# Patient Record
Sex: FEMALE | Race: WHITE | NOT HISPANIC OR LATINO | Employment: UNEMPLOYED | URBAN - METROPOLITAN AREA
[De-identification: names, ages, dates, MRNs, and addresses within clinical notes are randomized per-mention and may not be internally consistent; named-entity substitution may affect disease eponyms.]

---

## 2017-03-10 ENCOUNTER — HOSPITAL ENCOUNTER (EMERGENCY)
Facility: HOSPITAL | Age: 27
Discharge: HOME | End: 2017-03-10
Attending: EMERGENCY MEDICINE

## 2017-03-10 VITALS
TEMPERATURE: 98 F | BODY MASS INDEX: 31.34 KG/M2 | HEART RATE: 80 BPM | HEIGHT: 66 IN | SYSTOLIC BLOOD PRESSURE: 120 MMHG | DIASTOLIC BLOOD PRESSURE: 88 MMHG | WEIGHT: 195 LBS | OXYGEN SATURATION: 98 % | RESPIRATION RATE: 16 BRPM

## 2017-03-10 DIAGNOSIS — N76.0 BV (BACTERIAL VAGINOSIS): ICD-10-CM

## 2017-03-10 DIAGNOSIS — N93.8 DYSFUNCTIONAL UTERINE BLEEDING: Primary | ICD-10-CM

## 2017-03-10 DIAGNOSIS — B96.89 BV (BACTERIAL VAGINOSIS): ICD-10-CM

## 2017-03-10 LAB
B-HCG UR QL: NEGATIVE
BACTERIA #/AREA URNS HPF: ABNORMAL /HPF
BACTERIA GENITAL QL WET PREP: ABNORMAL
BASOPHILS # BLD AUTO: 0.04 K/UL
BASOPHILS NFR BLD: 0.3 %
BILIRUB UR QL STRIP: NEGATIVE
CLARITY UR: CLEAR
CLUE CELLS VAG QL WET PREP: ABNORMAL
COLOR UR: YELLOW
CTP QC/QA: YES
DIFFERENTIAL METHOD: ABNORMAL
EOSINOPHIL # BLD AUTO: 0.3 K/UL
EOSINOPHIL NFR BLD: 2.5 %
ERYTHROCYTE [DISTWIDTH] IN BLOOD BY AUTOMATED COUNT: 13.7 %
FILAMENT FUNGI VAG WET PREP-#/AREA: ABNORMAL
GLUCOSE UR QL STRIP: NEGATIVE
HCG INTACT+B SERPL-ACNC: <1.2 MIU/ML
HCT VFR BLD AUTO: 42.7 %
HGB BLD-MCNC: 14.7 G/DL
HGB UR QL STRIP: ABNORMAL
KETONES UR QL STRIP: NEGATIVE
LEUKOCYTE ESTERASE UR QL STRIP: NEGATIVE
LYMPHOCYTES # BLD AUTO: 5 K/UL
LYMPHOCYTES NFR BLD: 39 %
MCH RBC QN AUTO: 30.1 PG
MCHC RBC AUTO-ENTMCNC: 34.4 %
MCV RBC AUTO: 88 FL
MICROSCOPIC COMMENT: ABNORMAL
MONOCYTES # BLD AUTO: 0.7 K/UL
MONOCYTES NFR BLD: 5.1 %
NEUTROPHILS # BLD AUTO: 6.8 K/UL
NEUTROPHILS NFR BLD: 53.1 %
NITRITE UR QL STRIP: NEGATIVE
PH UR STRIP: 5 [PH] (ref 5–8)
PLATELET # BLD AUTO: 287 K/UL
PMV BLD AUTO: 9.6 FL
PROT UR QL STRIP: NEGATIVE
RBC # BLD AUTO: 4.88 M/UL
RBC #/AREA URNS HPF: 5 /HPF (ref 0–4)
SP GR UR STRIP: 1.02 (ref 1–1.03)
SPECIMEN SOURCE: ABNORMAL
SQUAMOUS #/AREA URNS HPF: 2 /HPF
T VAGINALIS GENITAL QL WET PREP: ABNORMAL
URN SPEC COLLECT METH UR: ABNORMAL
UROBILINOGEN UR STRIP-ACNC: NEGATIVE EU/DL
WBC # BLD AUTO: 12.87 K/UL
WBC #/AREA URNS HPF: 1 /HPF (ref 0–5)
WBC #/AREA VAG WET PREP: ABNORMAL
YEAST GENITAL QL WET PREP: ABNORMAL

## 2017-03-10 PROCEDURE — 81025 URINE PREGNANCY TEST: CPT | Performed by: EMERGENCY MEDICINE

## 2017-03-10 PROCEDURE — 84702 CHORIONIC GONADOTROPIN TEST: CPT

## 2017-03-10 PROCEDURE — 25000003 PHARM REV CODE 250: Performed by: NURSE PRACTITIONER

## 2017-03-10 PROCEDURE — 99284 EMERGENCY DEPT VISIT MOD MDM: CPT | Mod: 25

## 2017-03-10 PROCEDURE — 81000 URINALYSIS NONAUTO W/SCOPE: CPT

## 2017-03-10 PROCEDURE — 85025 COMPLETE CBC W/AUTO DIFF WBC: CPT

## 2017-03-10 PROCEDURE — 87591 N.GONORRHOEAE DNA AMP PROB: CPT

## 2017-03-10 PROCEDURE — 87210 SMEAR WET MOUNT SALINE/INK: CPT

## 2017-03-10 RX ORDER — METRONIDAZOLE 500 MG/1
500 TABLET ORAL EVERY 12 HOURS
Qty: 14 TABLET | Refills: 0 | Status: SHIPPED | OUTPATIENT
Start: 2017-03-10 | End: 2017-03-17

## 2017-03-10 RX ORDER — DICYCLOMINE HYDROCHLORIDE 10 MG/1
20 CAPSULE ORAL
Status: COMPLETED | OUTPATIENT
Start: 2017-03-10 | End: 2017-03-10

## 2017-03-10 RX ORDER — IBUPROFEN 600 MG/1
600 TABLET ORAL
Status: COMPLETED | OUTPATIENT
Start: 2017-03-10 | End: 2017-03-10

## 2017-03-10 RX ADMIN — DICYCLOMINE HYDROCHLORIDE 20 MG: 10 CAPSULE ORAL at 07:03

## 2017-03-10 RX ADMIN — IBUPROFEN 600 MG: 600 TABLET, FILM COATED ORAL at 07:03

## 2017-03-10 NOTE — ED AVS SNAPSHOT
[facility]  [address] [place] [address]               Shaye Bermudez   3/10/2017  6:03 PM   ED    Description:  Female : 1990   Department:  [facility]           Your Care was Coordinated By:     Provider Role From To    Taz Moralez MD Attending Provider 03/10/17 1807 --    Giovanna Miranda NP Nurse Practitioner 03/10/17 1807 --      Reason for Visit     Female  Problem           Diagnoses this Visit        Comments    Dysfunctional uterine bleeding    -  Primary     BV (bacterial vaginosis)           ED Disposition     None           To Do List           Follow-up Information     Schedule an appointment as soon as possible for a visit with Luis Ramos MD.    Specialties:  Obstetrics, Obstetrics and Gynecology    Why:  OB/GYN    Contact information:    [address] [place] [id number]  [phone]         These Medications        Disp Refills Start End    metronidazole (FLAGYL) 500 MG tablet 14 tablet 0 3/10/2017 3/17/2017    Take 1 tablet (500 mg total) by mouth every 12 (twelve) hours. - Oral    Pharmacy: [facility] [address] [place] [address] EXPY AT [facility] Ph #: [phone]         [facility]     [facility] -  Assistance  Registered nurses in the [facility] provide clinical advisement, health education, appointment booking, and other advisory services.  Call for this free service at [phone].             Medications           Message regarding Medications     Verify the changes and/or additions to your medication regime listed below are the same as discussed with your clinician today.  If any of these changes or additions are incorrect, please notify your healthcare provider.        START taking these NEW medications        Refills    metronidazole (FLAGYL) 500 MG tablet 0    Sig: Take 1 tablet (500 mg total) by mouth  "every 12 (twelve) hours.    Class: Print    Route: Oral      These medications were administered today        Dose Freq    dicyclomine capsule 20 mg 20 mg ED 1 Time    Sig: Take 2 capsules (20 mg total) by mouth ED 1 Time.    Class: Normal    Route: Oral    ibuprofen tablet 600 mg 600 mg ED 1 Time    Sig: Take 1 tablet (600 mg total) by mouth ED 1 Time.    Class: Normal    Route: Oral      STOP taking these medications     ibuprofen (ADVIL,MOTRIN) 600 MG tablet Take 1 tablet (600 mg total) by mouth every 6 (six) hours as needed for Pain.    misoprostol (CYTOTEC) 200 MCG Tab Take 2 tablets (400 mcg) by mouth in the morning prior to surgery           Verify that the below list of medications is an accurate representation of the medications you are currently taking.  If none reported, the list may be blank. If incorrect, please contact your healthcare provider. Carry this list with you in case of emergency.           Current Medications     metronidazole (FLAGYL) 500 MG tablet Take 1 tablet (500 mg total) by mouth every 12 (twelve) hours.           Clinical Reference Information           Your Vitals Were     BP Pulse Temp Resp Height Weight    134/67 (BP Location: Right arm, Patient Position: Sitting) 91 98.5 °F (36.9 °C) (Oral) 17 5' 6" (1.676 m) 88.5 kg (195 lb)    SpO2 BMI             100% 31.47 kg/m2         Allergies as of 3/10/2017     No Known Allergies      Immunizations Administered on Date of Encounter - 3/10/2017     None      ED Micro, Lab, POCT     Start Ordered       Status Ordering Provider    03/10/17 1827 03/10/17 1828  Vaginal Screen Vagina  STAT      Final result     03/10/17 1827 03/10/17 1828  C. trachomatis/N. gonorrhoeae by AMP DNA Cervix  STAT      In process     03/10/17 1827 03/10/17 1828  CBC auto differential  STAT      Preliminary result     03/10/17 1827 03/10/17 1828  hCG, quantitative, pregnancy  STAT      Final result     03/10/17 1758 03/10/17 1757  Urinalysis  STAT      Final result  "    03/10/17 1758 03/10/17 1757  POCT urine pregnancy  Once      Final result     03/10/17 1757 03/10/17 1757  Urinalysis Microscopic  Once      Final result       ED Imaging Orders     None        Discharge Instructions       Please return to the ED for any new or worsening symptoms: severe abdominal pain, bleeding saturating more than 1 pad per hour, loss of consciousness or any other concerns. Please follow up with OB/GYN within in the week. You may also call [phone] for the [facility] same day appointment line.    Discharge References/Attachments     BACTERIAL VAGINOSIS (BV) (ENGLISH)    VAGINAL INFECTION: BACTERIAL VAGINOSIS (ENGLISH)    DYSFUNCTIONAL UTERINE BLEEDING (ENGLISH)       [facility] complies with applicable Federal civil rights laws and does not discriminate on the basis of race, color, national origin, age, disability, or sex.        Language Assistance Services     ATTENTION: Language assistance services are available, free of charge. Please call [phone].      ATENCIÓN: Si habla español, tiene a watts disposición servicios gratuitos de asistencia lingüística. Llame al [phone].     CHÚ Ý: N?u b?n nói Ti?ng Vi?t, có các d?ch v? h? tr? ngôn ng? mi?n phí dành cho b?n. G?i s? [phone].

## 2017-03-11 NOTE — DISCHARGE INSTRUCTIONS
Please return to the ED for any new or worsening symptoms: severe abdominal pain, bleeding saturating more than 1 pad per hour, loss of consciousness or any other concerns. Please follow up with OB/GYN within in the week. You may also call [phone] for the [facility] same day appointment line.

## 2017-03-11 NOTE — ED TRIAGE NOTES
Empty sac since October vaginal bleeding since November cramping badly today and feels like something trying to come out

## 2017-03-13 LAB
C TRACH DNA SPEC QL NAA+PROBE: NOT DETECTED
N GONORRHOEA DNA SPEC QL NAA+PROBE: NOT DETECTED

## 2017-03-22 ENCOUNTER — NURSE TRIAGE (OUTPATIENT)
Dept: ADMINISTRATIVE | Facility: CLINIC | Age: 27
End: 2017-03-22

## 2017-03-22 NOTE — TELEPHONE ENCOUNTER
Reason for Disposition   Severe itching    Protocols used: ST RASH - WIDESPREAD AND CAUSE UNKNOWN-A-OH    Patient is calling stating that she was put on Flagyl  For BV and is almost finish taking the pills (3 more to go).  Patient is experiencing a rash that is on her chest and has gone to her neck.  Patient feels very itchy.  Patient also is experiencing sneezing and itchy eyes.  Advised to go to urgent care.  Patient is questioning how much is going to cost without insurance.  Called the back line to urgent care [facility] and was told the cost will be $250.00 without insurance.  Advised the patient accordingly.  Also advised home care measures and to continue to take the flagyl and if she become worst to go to ER for further evaluation.

## 2017-10-24 ENCOUNTER — TELEPHONE (OUTPATIENT)
Dept: OBSTETRICS AND GYNECOLOGY | Facility: CLINIC | Age: 27
End: 2017-10-24

## 2017-10-24 DIAGNOSIS — Z36.9 ANTENATAL SCREENING ENCOUNTER: Primary | ICD-10-CM

## 2017-10-24 NOTE — TELEPHONE ENCOUNTER
----- Message from Kathryn Ortiz sent at 10/24/2017 10:54 AM CDT -----  Contact: self    Who Called: pt    Date of Positive Preg Test: September 28th 1st day of Last Menstrual Cycle: since July 6th    List Any Difficulties: none    What Number to Call Back:[phone]    Pt should expect a return call by the end of the day.

## 2017-10-30 ENCOUNTER — HOSPITAL ENCOUNTER (INPATIENT)
Facility: OTHER | Age: 27
LOS: 1 days | Discharge: HOME | End: 2017-11-01
Attending: OBSTETRICS & GYNECOLOGY | Admitting: OBSTETRICS & GYNECOLOGY
Payer: MEDICAID

## 2017-10-30 ENCOUNTER — ROUTINE PRENATAL (OUTPATIENT)
Dept: OBSTETRICS AND GYNECOLOGY | Facility: CLINIC | Age: 27
End: 2017-10-30
Attending: OBSTETRICS & GYNECOLOGY
Payer: MEDICAID

## 2017-10-30 VITALS
WEIGHT: 184.31 LBS | BODY MASS INDEX: 29.75 KG/M2 | SYSTOLIC BLOOD PRESSURE: 112 MMHG | DIASTOLIC BLOOD PRESSURE: 54 MMHG

## 2017-10-30 DIAGNOSIS — Z3A.16 16 WEEKS GESTATION OF PREGNANCY: ICD-10-CM

## 2017-10-30 DIAGNOSIS — Z34.92 ENCOUNTER FOR SUPERVISION OF NORMAL PREGNANCY IN SECOND TRIMESTER, UNSPECIFIED GRAVIDITY: ICD-10-CM

## 2017-10-30 DIAGNOSIS — O23.00 PYELONEPHRITIS AFFECTING PREGNANCY: Primary | ICD-10-CM

## 2017-10-30 DIAGNOSIS — O34.219 PREVIOUS CESAREAN DELIVERY AFFECTING PREGNANCY, ANTEPARTUM: Primary | ICD-10-CM

## 2017-10-30 DIAGNOSIS — O23.02 PYELONEPHRITIS AFFECTING PREGNANCY IN SECOND TRIMESTER: ICD-10-CM

## 2017-10-30 DIAGNOSIS — Z12.4 PAP SMEAR FOR CERVICAL CANCER SCREENING: ICD-10-CM

## 2017-10-30 DIAGNOSIS — R31.9 HEMATURIA, UNSPECIFIED TYPE: ICD-10-CM

## 2017-10-30 LAB
ALBUMIN SERPL BCP-MCNC: 2.9 G/DL
ALP SERPL-CCNC: 121 U/L
ALT SERPL W/O P-5'-P-CCNC: 19 U/L
ANION GAP SERPL CALC-SCNC: 8 MMOL/L
AST SERPL-CCNC: 12 U/L
BACTERIA #/AREA URNS HPF: ABNORMAL /HPF
BASOPHILS # BLD AUTO: 0.02 K/UL
BASOPHILS NFR BLD: 0.1 %
BILIRUB SERPL-MCNC: 0.3 MG/DL
BILIRUB SERPL-MCNC: NORMAL MG/DL
BILIRUB UR QL STRIP: NEGATIVE
BLOOD URINE, POC: NORMAL
BUN SERPL-MCNC: 8 MG/DL
CALCIUM SERPL-MCNC: 9 MG/DL
CHLORIDE SERPL-SCNC: 107 MMOL/L
CLARITY UR: CLEAR
CO2 SERPL-SCNC: 23 MMOL/L
COLOR UR: YELLOW
COLOR, POC UA: NORMAL
CREAT SERPL-MCNC: 0.6 MG/DL
DIFFERENTIAL METHOD: ABNORMAL
EOSINOPHIL # BLD AUTO: 0.1 K/UL
EOSINOPHIL NFR BLD: 0.7 %
ERYTHROCYTE [DISTWIDTH] IN BLOOD BY AUTOMATED COUNT: 13.4 %
EST. GFR  (AFRICAN AMERICAN): >60 ML/MIN/1.73 M^2
EST. GFR  (NON AFRICAN AMERICAN): >60 ML/MIN/1.73 M^2
GLUCOSE SERPL-MCNC: 79 MG/DL
GLUCOSE UR QL STRIP: NEGATIVE
GLUCOSE UR QL STRIP: NORMAL
HCT VFR BLD AUTO: 35.1 %
HGB BLD-MCNC: 12 G/DL
HGB UR QL STRIP: ABNORMAL
HYALINE CASTS #/AREA URNS LPF: 0 /LPF
KETONES UR QL STRIP: NEGATIVE
KETONES UR QL STRIP: NORMAL
LEUKOCYTE ESTERASE UR QL STRIP: ABNORMAL
LEUKOCYTE ESTERASE URINE, POC: NORMAL
LYMPHOCYTES # BLD AUTO: 3.2 K/UL
LYMPHOCYTES NFR BLD: 21.1 %
MCH RBC QN AUTO: 30 PG
MCHC RBC AUTO-ENTMCNC: 34.2 G/DL
MCV RBC AUTO: 88 FL
MICROSCOPIC COMMENT: ABNORMAL
MONOCYTES # BLD AUTO: 0.8 K/UL
MONOCYTES NFR BLD: 5.1 %
NEUTROPHILS # BLD AUTO: 10.9 K/UL
NEUTROPHILS NFR BLD: 73 %
NITRITE UR QL STRIP: POSITIVE
NITRITE, POC UA: NORMAL
PH UR STRIP: 6 [PH] (ref 5–8)
PH, POC UA: 5
PLATELET # BLD AUTO: 267 K/UL
PLATELET BLD QL SMEAR: ABNORMAL
PMV BLD AUTO: 9.7 FL
POTASSIUM SERPL-SCNC: 3.7 MMOL/L
PROT SERPL-MCNC: 7.2 G/DL
PROT UR QL STRIP: ABNORMAL
PROTEIN, POC: NORMAL
RBC # BLD AUTO: 4 M/UL
RBC #/AREA URNS HPF: 1 /HPF (ref 0–4)
SODIUM SERPL-SCNC: 138 MMOL/L
SP GR UR STRIP: 1.02 (ref 1–1.03)
SPECIFIC GRAVITY, POC UA: 1.01
URN SPEC COLLECT METH UR: ABNORMAL
UROBILINOGEN UR STRIP-ACNC: NEGATIVE EU/DL
UROBILINOGEN, POC UA: NORMAL
WBC # BLD AUTO: 15.05 K/UL
WBC #/AREA URNS HPF: 20 /HPF (ref 0–5)

## 2017-10-30 PROCEDURE — 87186 SC STD MICRODIL/AGAR DIL: CPT

## 2017-10-30 PROCEDURE — 81002 URINALYSIS NONAUTO W/O SCOPE: CPT

## 2017-10-30 PROCEDURE — 99214 OFFICE O/P EST MOD 30 MIN: CPT | Mod: TH,S$PBB,, | Performed by: OBSTETRICS & GYNECOLOGY

## 2017-10-30 PROCEDURE — 80053 COMPREHEN METABOLIC PANEL: CPT

## 2017-10-30 PROCEDURE — 85025 COMPLETE CBC W/AUTO DIFF WBC: CPT

## 2017-10-30 PROCEDURE — G0378 HOSPITAL OBSERVATION PER HR: HCPCS

## 2017-10-30 PROCEDURE — 99284 EMERGENCY DEPT VISIT MOD MDM: CPT | Mod: ,,, | Performed by: OBSTETRICS & GYNECOLOGY

## 2017-10-30 PROCEDURE — 87088 URINE BACTERIA CULTURE: CPT

## 2017-10-30 PROCEDURE — 99213 OFFICE O/P EST LOW 20 MIN: CPT | Mod: PBBFAC,PN | Performed by: OBSTETRICS & GYNECOLOGY

## 2017-10-30 PROCEDURE — 11000001 HC ACUTE MED/SURG PRIVATE ROOM

## 2017-10-30 PROCEDURE — 88175 CYTOPATH C/V AUTO FLUID REDO: CPT

## 2017-10-30 PROCEDURE — 99285 EMERGENCY DEPT VISIT HI MDM: CPT | Mod: 25,27

## 2017-10-30 PROCEDURE — 63600175 PHARM REV CODE 636 W HCPCS: Performed by: STUDENT IN AN ORGANIZED HEALTH CARE EDUCATION/TRAINING PROGRAM

## 2017-10-30 PROCEDURE — 99999 PR PBB SHADOW E&M-EST. PATIENT-LVL III: CPT | Mod: PBBFAC,,, | Performed by: OBSTETRICS & GYNECOLOGY

## 2017-10-30 PROCEDURE — 87591 N.GONORRHOEAE DNA AMP PROB: CPT

## 2017-10-30 PROCEDURE — 87086 URINE CULTURE/COLONY COUNT: CPT

## 2017-10-30 PROCEDURE — 25000003 PHARM REV CODE 250: Performed by: STUDENT IN AN ORGANIZED HEALTH CARE EDUCATION/TRAINING PROGRAM

## 2017-10-30 PROCEDURE — 81000 URINALYSIS NONAUTO W/SCOPE: CPT

## 2017-10-30 PROCEDURE — 87077 CULTURE AEROBIC IDENTIFY: CPT

## 2017-10-30 RX ORDER — SODIUM CHLORIDE 9 MG/ML
INJECTION, SOLUTION INTRAVENOUS CONTINUOUS
Status: DISCONTINUED | OUTPATIENT
Start: 2017-10-30 | End: 2017-10-31

## 2017-10-30 RX ORDER — ACETAMINOPHEN 325 MG/1
650 TABLET ORAL EVERY 6 HOURS PRN
Status: DISCONTINUED | OUTPATIENT
Start: 2017-10-30 | End: 2017-11-01 | Stop reason: HOSPADM

## 2017-10-30 RX ORDER — ONDANSETRON 8 MG/1
8 TABLET, ORALLY DISINTEGRATING ORAL EVERY 8 HOURS PRN
Status: DISCONTINUED | OUTPATIENT
Start: 2017-10-30 | End: 2017-11-01 | Stop reason: HOSPADM

## 2017-10-30 RX ORDER — PRENATAL WITH FERROUS FUM AND FOLIC ACID 3080; 920; 120; 400; 22; 1.84; 3; 20; 10; 1; 12; 200; 27; 25; 2 [IU]/1; [IU]/1; MG/1; [IU]/1; MG/1; MG/1; MG/1; MG/1; MG/1; MG/1; UG/1; MG/1; MG/1; MG/1; MG/1
1 TABLET ORAL DAILY
Status: DISCONTINUED | OUTPATIENT
Start: 2017-10-31 | End: 2017-11-01 | Stop reason: HOSPADM

## 2017-10-30 RX ORDER — AMOXICILLIN 250 MG
1 CAPSULE ORAL NIGHTLY PRN
Status: DISCONTINUED | OUTPATIENT
Start: 2017-10-30 | End: 2017-11-01 | Stop reason: HOSPADM

## 2017-10-30 RX ORDER — SIMETHICONE 80 MG
1 TABLET,CHEWABLE ORAL EVERY 6 HOURS PRN
Status: DISCONTINUED | OUTPATIENT
Start: 2017-10-30 | End: 2017-11-01 | Stop reason: HOSPADM

## 2017-10-30 RX ORDER — DIPHENHYDRAMINE HCL 25 MG
25 CAPSULE ORAL EVERY 4 HOURS PRN
Status: DISCONTINUED | OUTPATIENT
Start: 2017-10-30 | End: 2017-11-01 | Stop reason: HOSPADM

## 2017-10-30 RX ORDER — DIPHENHYDRAMINE HYDROCHLORIDE 50 MG/ML
25 INJECTION INTRAMUSCULAR; INTRAVENOUS EVERY 4 HOURS PRN
Status: DISCONTINUED | OUTPATIENT
Start: 2017-10-30 | End: 2017-11-01 | Stop reason: HOSPADM

## 2017-10-30 RX ADMIN — SODIUM CHLORIDE: 0.9 INJECTION, SOLUTION INTRAVENOUS at 08:10

## 2017-10-30 RX ADMIN — CEFTRIAXONE 1 G: 1 INJECTION, SOLUTION INTRAVENOUS at 08:10

## 2017-10-30 RX ADMIN — ACETAMINOPHEN 650 MG: 325 TABLET ORAL at 08:10

## 2017-10-30 NOTE — PROGRESS NOTES
Transfer of care from [facility] at 16 weeks.  Seen once at [facility] about 3 weeks ago to confirm pregnancy and diagnosed with UTI - Rx Macrobid (she does not have any records with her today).  Over the past week she has noted the development of right back / flank pain that seems to radiate to her lower abdomen.  Mild urgency / frequency.  She has had pyelo in the past and feels that her current symptoms are similar.  No fever.  Urine dip with large blood.  We discussed pyelo vs stone and the need for evaluation on L&D.  She has not had any IOB labs drawn yet or ultrasound confirmation of EDC.  Declines genetic testing.  Pap and GC/CT performed today.  Draw IOB labs on L&D.  Previous C/S x 2 - she understands the need for another C/S.  Now smoking 3 cigarettes / day.  We reviewed the risks of smoking and pregnancy- IUGR, IUFD, SIDs, etc - and the need to quit.  To L&D now: rule out pyelo vs stone.  Return 3 weeks.

## 2017-10-30 NOTE — ED PROVIDER NOTES
Encounter Date: 10/30/2017       History     Chief Complaint   Patient presents with    Flank Pain     Shaye Bermudez is a 27 y.o. [id number] at 16w4d presenting from clinic with concern for possible pyelonephritis. Patient reports right flank pain for the past month. Had a positive urinalysis and was treated with macrobid with some relief. Symptoms now worse, pain 7/10 in severity. Constant, radiating to right groin. Also reporting nausea, vomiting, dizziness, weakness. Denies fevers, chills. Feels pressure in her bladder while urinating but no dysuria, urgency, frequency. Is noticing dark/pink tinged urine. This IUP is complicated by h/o 2 c sections. Of note she had pyelonephritis in a prior pregnancy requiring a 1 week inpatient stay. No h/o kidney stones.            Review of patient's allergies indicates:  No Known Allergies  History reviewed. No pertinent past medical history.  Past Surgical History:   Procedure Laterality Date     SECTION      CHOLECYSTECTOMY       Family History   Problem Relation Age of Onset    Breast cancer Paternal Grandmother     Diabetes Mother     Hypertension Neg Hx     Colon cancer Neg Hx     Ovarian cancer Neg Hx      Social History   Substance Use Topics    Smoking status: Current Every Day Smoker    Smokeless tobacco: Never Used    Alcohol use No     Review of Systems   Constitutional: Negative for chills and fever.   HENT: Negative for congestion.    Eyes: Negative for visual disturbance.   Respiratory: Negative for shortness of breath.    Cardiovascular: Negative for chest pain.   Gastrointestinal: Positive for nausea and vomiting. Negative for abdominal pain and diarrhea.   Genitourinary: Positive for flank pain. Negative for dysuria, frequency, hematuria, urgency, vaginal bleeding and vaginal discharge.   Musculoskeletal: Negative for back pain.   Neurological: Positive for dizziness. Negative for headaches.       Physical Exam        Physical  Exam    Vitals reviewed.  Constitutional: She appears well-developed and well-nourished. She is not diaphoretic. No distress.   HENT:   Head: Normocephalic and atraumatic.   Eyes: EOM are normal.   Cardiovascular: Normal rate.   Pulmonary/Chest: No respiratory distress.   Abdominal: Soft. She exhibits no distension. There is no tenderness. There is CVA tenderness. There is no rebound and no guarding.   Genitourinary:   Genitourinary Comments: + right CVA tenderness   Musculoskeletal: Normal range of motion.   Neurological: She is alert and oriented to person, place, and time.   Skin: Skin is warm and dry.   Psychiatric: She has a normal mood and affect. Her behavior is normal. Judgment and thought content normal.         ED Course   Procedures  Labs Reviewed   CBC W/ AUTO DIFFERENTIAL - Abnormal; Notable for the following:        Result Value    WBC 15.05 (*)     Hematocrit 35.1 (*)     Gran # 10.9 (*)     All other components within normal limits   COMPREHENSIVE METABOLIC PANEL - Abnormal; Notable for the following:     Albumin 2.9 (*)     All other components within normal limits   URINALYSIS - Abnormal; Notable for the following:     Protein, UA 1+ (*)     Occult Blood UA 1+ (*)     Nitrite, UA Positive (*)     Leukocytes, UA 1+ (*)     All other components within normal limits   URINALYSIS MICROSCOPIC - Abnormal; Notable for the following:     WBC, UA 20 (*)     Bacteria, UA Many (*)     All other components within normal limits   POCT URINALYSIS, DIPSTICK OR TABLET REAGENT, AUTOMATED, WITH MICROSCOP             Medical Decision Making:   Initial Assessment:   27 y.o. [id number] at 16w4d presenting from clinic with concern for possible pyelonephritis.  Differential Diagnosis:   Pyelonephritis  ED Management:  --urine dip, CBC, CMP, start IV  --UA: +1 protein, +1 blood, +nitrites, + leuks  --Renal US pending  --CBC notable for leukocytosis of 15  --Due to clinical picture of pyelo with CVA tenderness and past  history of pyelo in pregnancy, will admit of IV antibiotics.    Dayron Dacosta MD  PGY-2 OB/GYN  [phone]                    Attending Attestation:   Physician Attestation Statement for Resident:  As the supervising MD   Physician Attestation Statement: I have personally seen and examined this patient.   I agree with the above history. -:   As the supervising MD I agree with the above PE.    As the supervising MD I agree with the above treatment, course, plan, and disposition.  I was personally present during the critical portions of the procedure(s) performed by the resident and was immediately available in the ED to provide services and assistance as needed during the entire procedure.  I have reviewed and agree with the residents interpretation of the following: lab data and rhythm strips.  I have reviewed the following: old records at this facility.                    ED Course as of Nov 03 0826   Mon Oct 30, 2017   1709 I  [HU]      ED Course User Index  [HU] Nellie Hwang DO     Clinical Impression:   The primary encounter diagnosis was Pyelonephritis affecting pregnancy. Diagnoses of 16 weeks gestation of pregnancy, Encounter for supervision of normal pregnancy in second trimester, unspecified , and Pyelonephritis affecting pregnancy in second trimester were also pertinent to this visit.    Disposition:   Disposition: Admitted  Condition: Stable       Shree Espinoza MD  PGY1, OBN  [facility]                   Damir Dacosta MD  Resident  10/30/17 2103       Nellie Hwang DO  1760

## 2017-10-31 LAB
BASOPHILS # BLD AUTO: 0.02 K/UL
BASOPHILS NFR BLD: 0.1 %
C TRACH DNA SPEC QL NAA+PROBE: NOT DETECTED
DIFFERENTIAL METHOD: ABNORMAL
EOSINOPHIL # BLD AUTO: 0.2 K/UL
EOSINOPHIL NFR BLD: 1.1 %
ERYTHROCYTE [DISTWIDTH] IN BLOOD BY AUTOMATED COUNT: 13.4 %
HCT VFR BLD AUTO: 34.9 %
HGB BLD-MCNC: 11.7 G/DL
LYMPHOCYTES # BLD AUTO: 2.6 K/UL
LYMPHOCYTES NFR BLD: 18.9 %
MCH RBC QN AUTO: 29.8 PG
MCHC RBC AUTO-ENTMCNC: 33.5 G/DL
MCV RBC AUTO: 89 FL
MONOCYTES # BLD AUTO: 0.9 K/UL
MONOCYTES NFR BLD: 6.5 %
N GONORRHOEA DNA SPEC QL NAA+PROBE: NOT DETECTED
NEUTROPHILS # BLD AUTO: 10.1 K/UL
NEUTROPHILS NFR BLD: 73 %
PLATELET # BLD AUTO: 223 K/UL
PMV BLD AUTO: 9.9 FL
RBC # BLD AUTO: 3.93 M/UL
WBC # BLD AUTO: 13.84 K/UL

## 2017-10-31 PROCEDURE — 85025 COMPLETE CBC W/AUTO DIFF WBC: CPT

## 2017-10-31 PROCEDURE — 63600175 PHARM REV CODE 636 W HCPCS: Performed by: STUDENT IN AN ORGANIZED HEALTH CARE EDUCATION/TRAINING PROGRAM

## 2017-10-31 PROCEDURE — 25000003 PHARM REV CODE 250: Performed by: STUDENT IN AN ORGANIZED HEALTH CARE EDUCATION/TRAINING PROGRAM

## 2017-10-31 PROCEDURE — 36415 COLL VENOUS BLD VENIPUNCTURE: CPT

## 2017-10-31 PROCEDURE — 99232 SBSQ HOSP IP/OBS MODERATE 35: CPT | Mod: ,,, | Performed by: OBSTETRICS & GYNECOLOGY

## 2017-10-31 PROCEDURE — 11000001 HC ACUTE MED/SURG PRIVATE ROOM

## 2017-10-31 RX ORDER — NITROFURANTOIN 25; 75 MG/1; MG/1
100 CAPSULE ORAL DAILY
Qty: 195 CAPSULE | Refills: 0 | Status: SHIPPED | OUTPATIENT
Start: 2017-11-10 | End: 2018-05-24

## 2017-10-31 RX ORDER — SODIUM CHLORIDE, SODIUM LACTATE, POTASSIUM CHLORIDE, CALCIUM CHLORIDE 600; 310; 30; 20 MG/100ML; MG/100ML; MG/100ML; MG/100ML
INJECTION, SOLUTION INTRAVENOUS CONTINUOUS
Status: DISCONTINUED | OUTPATIENT
Start: 2017-10-31 | End: 2017-11-01 | Stop reason: HOSPADM

## 2017-10-31 RX ORDER — CEPHALEXIN 500 MG/1
500 CAPSULE ORAL EVERY 12 HOURS
Status: DISCONTINUED | OUTPATIENT
Start: 2017-11-01 | End: 2017-11-01 | Stop reason: HOSPADM

## 2017-10-31 RX ORDER — OXYCODONE HYDROCHLORIDE 5 MG/1
5 TABLET ORAL ONCE AS NEEDED
Status: ACTIVE | OUTPATIENT
Start: 2017-10-31 | End: 2017-10-31

## 2017-10-31 RX ORDER — CEPHALEXIN 500 MG/1
500 CAPSULE ORAL EVERY 12 HOURS
Qty: 10 CAPSULE | Refills: 0 | Status: SHIPPED | OUTPATIENT
Start: 2017-11-01 | End: 2017-11-01

## 2017-10-31 RX ADMIN — SODIUM CHLORIDE, SODIUM LACTATE, POTASSIUM CHLORIDE, AND CALCIUM CHLORIDE: .6; .31; .03; .02 INJECTION, SOLUTION INTRAVENOUS at 08:10

## 2017-10-31 RX ADMIN — ACETAMINOPHEN 650 MG: 325 TABLET ORAL at 06:10

## 2017-10-31 RX ADMIN — PRENATAL VIT W/ FE FUMARATE-FA TAB 27-0.8 MG 1 EACH: 27-0.8 TAB at 09:10

## 2017-10-31 RX ADMIN — CEFTRIAXONE 1 G: 1 INJECTION, SOLUTION INTRAVENOUS at 07:10

## 2017-10-31 NOTE — SUBJECTIVE & OBJECTIVE
Obstetric History       T2      L2     SAB3   TAB0   Ectopic0   Multiple0   Live Births2       # Outcome Date GA Lbr Colby/2nd Weight Sex Delivery Anes PTL Lv   7 Current            6 Term 10/24/14 39w1d  3.35 kg (7 lb 6.2 oz) F CS-LTranv Spinal N KAILEE      Apgar1:  9                Apgar5: 9   5 SAB 2013 6w0d          4 SAB 2008 8w0d          3 2008 7w0d          2 Term 07 38w0d 36:00 3.515 kg (7 lb 12 oz) M CS-LTranv EPI N KAILEE      Name: David Srivastava                  History reviewed. No pertinent past medical history.  Past Surgical History:   Procedure Laterality Date     SECTION      CHOLECYSTECTOMY         No prescriptions prior to admission.       Review of patient's allergies indicates:  No Known Allergies     Family History     Problem Relation (Age of Onset)    Breast cancer Paternal Grandmother    Diabetes Mother        Social History Main Topics    Smoking status: Current Every Day Smoker    Smokeless tobacco: Never Used    Alcohol use No    Drug use: Unknown    Sexual activity: Yes     Partners: Male     Review of Systems   Constitutional: Positive for chills. Negative for fever.   Eyes: Negative.    Respiratory: Negative for shortness of breath.    Cardiovascular: Negative for chest pain.   Gastrointestinal: Negative for abdominal pain, bloating and constipation.   Endocrine: Negative.    Genitourinary: Positive for dysuria. Negative for dyspareunia, frequency, menstrual problem, pelvic pain and vaginal bleeding.   Musculoskeletal: Positive for back pain.   Skin:  Negative.   Neurological: Negative for headaches.   Hematological: Negative.    Psychiatric/Behavioral: Negative.    Breast: Negative for breast mass and breast pain     Objective:     Vital Signs (Most Recent):  Temp: 98.5 °F (36.9 °C) (10/30/17 2030)  Pulse: 98 (10/30/17 2030)  Resp: 16 (10/30/17 2030)  BP: (!) 120/59 (10/30/17 2030)  SpO2: 96 % (10/30/17 1627) Vital Signs (24h  Range):  Temp:  [97 °F (36.1 °C)-98.5 °F (36.9 °C)] 98.5 °F (36.9 °C)  Pulse:  [] 98  Resp:  [16] 16  SpO2:  [96 %-98 %] 96 %  BP: (106-120)/(54-59) 120/59     Weight: 83.6 kg (184 lb 4.9 oz)  Body mass index is 29.75 kg/m².    FHT: present    Physical Exam:   Constitutional: She is oriented to person, place, and time. She appears well-developed and well-nourished. No distress.    HENT:   Head: Normocephalic and atraumatic.    Eyes: EOM are normal. Pupils are equal, round, and reactive to light.    Neck: Normal range of motion. Neck supple.    Cardiovascular: Normal rate and regular rhythm.     Pulmonary/Chest: Effort normal. No respiratory distress.        Abdominal: Soft. She exhibits no distension and no abdominal incision. There is no tenderness. There is CVA tenderness (right side).             Musculoskeletal: Normal range of motion. She exhibits no edema or tenderness.       Neurological: She is alert and oriented to person, place, and time.    Skin: Skin is warm and dry. She is not diaphoretic.        Significant Labs:  Lab Results   Component Value Date    GROUPTRH B POS 10/20/2015    HEPBSAG Negative 10/20/2015    STREPBCULT No Group B Streptococcus isolated 09/30/2014       Recent Results (from the past 24 hour(s))   CBC auto differential    Collection Time: 10/30/17  4:40 PM   Result Value Ref Range    WBC 15.05 (H) 3.90 - 12.70 K/uL    RBC 4.00 4.00 - 5.40 M/uL    Hemoglobin 12.0 12.0 - 16.0 g/dL    Hematocrit 35.1 (L) 37.0 - 48.5 %    MCV 88 82 - 98 fL    MCH 30.0 27.0 - 31.0 pg    MCHC 34.2 32.0 - 36.0 g/dL    RDW 13.4 11.5 - 14.5 %    Platelets 267 150 - 350 K/uL    MPV 9.7 9.2 - 12.9 fL    Gran # 10.9 (H) 1.8 - 7.7 K/uL    Lymph # 3.2 1.0 - 4.8 K/uL    Mono # 0.8 0.3 - 1.0 K/uL    Eos # 0.1 0.0 - 0.5 K/uL    Baso # 0.02 0.00 - 0.20 K/uL    Gran% 73.0 38.0 - 73.0 %    Lymph% 21.1 18.0 - 48.0 %    Mono% 5.1 4.0 - 15.0 %    Eosinophil% 0.7 0.0 - 8.0 %    Basophil% 0.1 0.0 - 1.9 %    Platelet  Estimate Appears normal     Differential Method Automated    Comprehensive metabolic panel    Collection Time: 10/30/17  4:40 PM   Result Value Ref Range    Sodium 138 136 - 145 mmol/L    Potassium 3.7 3.5 - 5.1 mmol/L    Chloride 107 95 - 110 mmol/L    CO2 23 23 - 29 mmol/L    Glucose 79 70 - 110 mg/dL    BUN, Bld 8 6 - 20 mg/dL    Creatinine 0.6 0.5 - 1.4 mg/dL    Calcium 9.0 8.7 - 10.5 mg/dL    Total Protein 7.2 6.0 - 8.4 g/dL    Albumin 2.9 (L) 3.5 - 5.2 g/dL    Total Bilirubin 0.3 0.1 - 1.0 mg/dL    Alkaline Phosphatase 121 55 - 135 U/L    AST 12 10 - 40 U/L    ALT 19 10 - 44 U/L    Anion Gap 8 8 - 16 mmol/L    eGFR if African American >60 >60 mL/min/1.73 m^2    eGFR if non African American >60 >60 mL/min/1.73 m^2   Urinalysis Clean Catch    Collection Time: 10/30/17  4:40 PM   Result Value Ref Range    Specimen UA Urine, Clean Catch     Color, UA Yellow Yellow, Straw, Keeley    Appearance, UA Clear Clear    pH, UA 6.0 5.0 - 8.0    Specific Gravity, UA 1.025 1.005 - 1.030    Protein, UA 1+ (A) Negative    Glucose, UA Negative Negative    Ketones, UA Negative Negative    Bilirubin (UA) Negative Negative    Occult Blood UA 1+ (A) Negative    Nitrite, UA Positive (A) Negative    Urobilinogen, UA Negative <2.0 EU/dL    Leukocytes, UA 1+ (A) Negative   Urinalysis Microscopic    Collection Time: 10/30/17  4:40 PM   Result Value Ref Range    RBC, UA 1 0 - 4 /hpf    WBC, UA 20 (H) 0 - 5 /hpf    Bacteria, UA Many (A) None-Occ /hpf    Hyaline Casts, UA 0 0-1/lpf /lpf    Microscopic Comment SEE COMMENT    POCT urinalysis, dipstick or tablet reag    Collection Time: 10/30/17  4:56 PM   Result Value Ref Range    Color, UA keeley     Spec Grav UA 1.015     pH, UA 5     WBC, UA +     Nitrite, UA +     Protein 30+     Glucose, UA normal     Ketones, UA -     Urobilinogen, UA normal     Bilirubin -     Blood, UA above 250

## 2017-10-31 NOTE — H&P
[facility]  Obstetrics  History & Physical    Patient Name: Shaye Bermudez  MRN: [id number]  Admission Date: 10/30/2017  Primary Care Provider: Primary Doctor No    Subjective:     Principal Problem:Pyelonephritis affecting pregnancy    History of Present Illness:  Shaye Bermudez is a 27 y.o. [id number] at 16w4d presenting from clinic with concern for possible pyelonephritis. Patient reports right flank pain for the past month. Had a positive urinalysis and was treated with macrobid with some relief. Symptoms now worse, pain 7/10 in severity. Constant, radiating to right groin. Also reporting nausea, vomiting, dizziness, weakness. Denies fevers, chills. Feels pressure in her bladder while urinating but no dysuria, urgency, frequency. Is noticing dark/pink tinged urine. This IUP is complicated by h/o 2 c sections. Of note she had pyelonephritis in a prior pregnancy requiring a 1 week inpatient stay. No h/o kidney stones.    In the ED, she was afebrile, but urinalysis was concerning for infection with nitrites, blood, and leukocytes. WBC elevated to 15.         Obstetric History       T2      L2     SAB3   TAB0   Ectopic0   Multiple0   Live Births2       # Outcome Date GA Lbr Colby/2nd Weight Sex Delivery Anes PTL Lv   7 Current            6 Term 10/24/14 39w1d  3.35 kg (7 lb 6.2 oz) F CS-LTranv Spinal N KAILEE      Apgar1:  9                Apgar5: 9   5 SAB 2013 6w0d          4 SAB 2008 8w0d          3 2008 7w0d          2 Term 07 38w0d 36:00 3.515 kg (7 lb 12 oz) M CS-LTranv EPI N KAILEE      Name: David Srivastava                  History reviewed. No pertinent past medical history.  Past Surgical History:   Procedure Laterality Date     SECTION      CHOLECYSTECTOMY         No prescriptions prior to admission.       Review of patient's allergies indicates:  No Known Allergies     Family History     Problem Relation (Age of Onset)    Breast cancer  Paternal Grandmother    Diabetes Mother        Social History Main Topics    Smoking status: Current Every Day Smoker    Smokeless tobacco: Never Used    Alcohol use No    Drug use: Unknown    Sexual activity: Yes     Partners: Male     Review of Systems   Constitutional: Positive for chills. Negative for fever.   Eyes: Negative.    Respiratory: Negative for shortness of breath.    Cardiovascular: Negative for chest pain.   Gastrointestinal: Negative for abdominal pain, bloating and constipation.   Endocrine: Negative.    Genitourinary: Positive for dysuria. Negative for dyspareunia, frequency, menstrual problem, pelvic pain and vaginal bleeding.   Musculoskeletal: Positive for back pain.   Skin:  Negative.   Neurological: Negative for headaches.   Hematological: Negative.    Psychiatric/Behavioral: Negative.    Breast: Negative for breast mass and breast pain     Objective:     Vital Signs (Most Recent):  Temp: 98.5 °F (36.9 °C) (10/30/17 2030)  Pulse: 98 (10/30/17 2030)  Resp: 16 (10/30/17 2030)  BP: (!) 120/59 (10/30/17 2030)  SpO2: 96 % (10/30/17 1627) Vital Signs (24h Range):  Temp:  [97 °F (36.1 °C)-98.5 °F (36.9 °C)] 98.5 °F (36.9 °C)  Pulse:  [] 98  Resp:  [16] 16  SpO2:  [96 %-98 %] 96 %  BP: (106-120)/(54-59) 120/59     Weight: 83.6 kg (184 lb 4.9 oz)  Body mass index is 29.75 kg/m².    FHT: present    Physical Exam:   Constitutional: She is oriented to person, place, and time. She appears well-developed and well-nourished. No distress.    HENT:   Head: Normocephalic and atraumatic.    Eyes: EOM are normal. Pupils are equal, round, and reactive to light.    Neck: Normal range of motion. Neck supple.    Cardiovascular: Normal rate and regular rhythm.     Pulmonary/Chest: Effort normal. No respiratory distress.        Abdominal: Soft. She exhibits no distension and no abdominal incision. There is no tenderness. There is CVA tenderness (right side).             Musculoskeletal: Normal range of  motion. She exhibits no edema or tenderness.       Neurological: She is alert and oriented to person, place, and time.    Skin: Skin is warm and dry. She is not diaphoretic.        Significant Labs:  Lab Results   Component Value Date    GROUPTRH B POS 10/20/2015    HEPBSAG Negative 10/20/2015    STREPBCULT No Group B Streptococcus isolated 09/30/2014       Recent Results (from the past 24 hour(s))   CBC auto differential    Collection Time: 10/30/17  4:40 PM   Result Value Ref Range    WBC 15.05 (H) 3.90 - 12.70 K/uL    RBC 4.00 4.00 - 5.40 M/uL    Hemoglobin 12.0 12.0 - 16.0 g/dL    Hematocrit 35.1 (L) 37.0 - 48.5 %    MCV 88 82 - 98 fL    MCH 30.0 27.0 - 31.0 pg    MCHC 34.2 32.0 - 36.0 g/dL    RDW 13.4 11.5 - 14.5 %    Platelets 267 150 - 350 K/uL    MPV 9.7 9.2 - 12.9 fL    Gran # 10.9 (H) 1.8 - 7.7 K/uL    Lymph # 3.2 1.0 - 4.8 K/uL    Mono # 0.8 0.3 - 1.0 K/uL    Eos # 0.1 0.0 - 0.5 K/uL    Baso # 0.02 0.00 - 0.20 K/uL    Gran% 73.0 38.0 - 73.0 %    Lymph% 21.1 18.0 - 48.0 %    Mono% 5.1 4.0 - 15.0 %    Eosinophil% 0.7 0.0 - 8.0 %    Basophil% 0.1 0.0 - 1.9 %    Platelet Estimate Appears normal     Differential Method Automated    Comprehensive metabolic panel    Collection Time: 10/30/17  4:40 PM   Result Value Ref Range    Sodium 138 136 - 145 mmol/L    Potassium 3.7 3.5 - 5.1 mmol/L    Chloride 107 95 - 110 mmol/L    CO2 23 23 - 29 mmol/L    Glucose 79 70 - 110 mg/dL    BUN, Bld 8 6 - 20 mg/dL    Creatinine 0.6 0.5 - 1.4 mg/dL    Calcium 9.0 8.7 - 10.5 mg/dL    Total Protein 7.2 6.0 - 8.4 g/dL    Albumin 2.9 (L) 3.5 - 5.2 g/dL    Total Bilirubin 0.3 0.1 - 1.0 mg/dL    Alkaline Phosphatase 121 55 - 135 U/L    AST 12 10 - 40 U/L    ALT 19 10 - 44 U/L    Anion Gap 8 8 - 16 mmol/L    eGFR if African American >60 >60 mL/min/1.73 m^2    eGFR if non African American >60 >60 mL/min/1.73 m^2   Urinalysis Clean Catch    Collection Time: 10/30/17  4:40 PM   Result Value Ref Range    Specimen UA Urine, Clean Catch      Color, UA Yellow Yellow, Straw, Keeley    Appearance, UA Clear Clear    pH, UA 6.0 5.0 - 8.0    Specific Gravity, UA 1.025 1.005 - 1.030    Protein, UA 1+ (A) Negative    Glucose, UA Negative Negative    Ketones, UA Negative Negative    Bilirubin (UA) Negative Negative    Occult Blood UA 1+ (A) Negative    Nitrite, UA Positive (A) Negative    Urobilinogen, UA Negative <2.0 EU/dL    Leukocytes, UA 1+ (A) Negative   Urinalysis Microscopic    Collection Time: 10/30/17  4:40 PM   Result Value Ref Range    RBC, UA 1 0 - 4 /hpf    WBC, UA 20 (H) 0 - 5 /hpf    Bacteria, UA Many (A) None-Occ /hpf    Hyaline Casts, UA 0 0-1/lpf /lpf    Microscopic Comment SEE COMMENT    POCT urinalysis, dipstick or tablet reag    Collection Time: 10/30/17  4:56 PM   Result Value Ref Range    Color, UA keeley     Spec Grav UA 1.015     pH, UA 5     WBC, UA +     Nitrite, UA +     Protein 30+     Glucose, UA normal     Ketones, UA -     Urobilinogen, UA normal     Bilirubin -     Blood, UA above 250          Assessment/Plan:     27 y.o. female  at 16w4d for:    * Pyelonephritis affecting pregnancy    - Admit for IV rocephin  - Staff notified  - Urine culture pending  - CBC, BMP in AM  - Follow up labs and vitals in AM  - Will follow clinical picture for disposition            Damir Dacosta MD  Obstetrics  [facility]

## 2017-10-31 NOTE — ASSESSMENT & PLAN NOTE
- rocephin day #2  - Tmax: 98.9; Pulse: [] 95  - UA 1+ occult blood, + nitrites, many bacteria; Urine culture pending  - CBC, BMP pending  - Will follow clinical picture for disposition

## 2017-10-31 NOTE — PLAN OF CARE
Problem: Patient Care Overview  Goal: Plan of Care Review  Outcome: Ongoing (interventions implemented as appropriate)  VSS throughout shift. Pt afebrile. Pt denies ctxs, LOF, & VB. Pain controlled with PRN acetaminophen. No needs at this time. Call light within reach with bed locked in lowest position. Will continue to monitor.

## 2017-10-31 NOTE — SUBJECTIVE & OBJECTIVE
Obstetric HPI:  HD #2: Doing well. Afebrile. Pain controlled with tylenol overnight. Reports significant pain this AM. No N/V overnight. Rocephin day #2 today.    Patient reports None contractions, too early for fetal  movement, absent vaginal bleeding, absent loss of fluid      Objective:     Vital Signs (Most Recent):  Temp: 97.8 °F (36.6 °C) (10/31/17 0406)  Pulse: 95 (10/31/17 0406)  Resp: 16 (10/31/17 0406)  BP: (!) 110/57 (10/31/17 0406)  SpO2: 96 % (10/30/17 1627) Vital Signs (24h Range):  Temp:  [97 °F (36.1 °C)-98.9 °F (37.2 °C)] 97.8 °F (36.6 °C)  Pulse:  [] 95  Resp:  [14-16] 16  SpO2:  [96 %-98 %] 96 %  BP: (106-120)/(54-59) 110/57     Weight: 83.6 kg (184 lb 4.9 oz)  Body mass index is 29.75 kg/m².    FHT: verified on admit, FHT this shift pending    Intake/Output Summary (Last 24 hours) at 10/31/17 0639  Last data filed at 10/31/17 0600   Gross per 24 hour   Intake             1850 ml   Output                0 ml   Net             1850 ml     Significant Labs:  Recent Lab Results       10/31/17  0359 10/30/17  1656 10/30/17  1640 10/30/17  1337      Color, UA  keeley       Bilirubin  -       Spec Grav UA  1.015       pH, UA  5       Protein  30+       Urobilinogen, UA  normal       Nitrite, UA  +       Albumin   2.9(L)      Alkaline Phosphatase   121      ALT   19      Anion Gap   8      Appearance, UA   Clear      AST   12      Bacteria, UA   Many(A)      Baso # 0.02  0.02      Basophil% 0.1  0.1      Bilirubin (UA)   Negative      Total Bilirubin   0.3  Comment:  For infants and newborns, interpretation of results should be based  on gestational age, weight and in agreement with clinical  observations.  Premature Infant recommended reference ranges:  Up to 24 hours.............<8.0 mg/dL  Up to 48 hours............<12.0 mg/dL  3-5 days..................<15.0 mg/dL  6-29 days.................<15.0 mg/dL        BUN, Bld   8      Calcium   9.0      Chlamydia, Amplified DNA    Not Detected      Chloride   107      CO2   23      Color, UA   Yellow      Creatinine   0.6      Differential Method Automated  Automated      eGFR if    >60      eGFR if non    >60  Comment:  Calculation used to obtain the estimated glomerular filtration  rate (eGFR) is the CKD-EPI equation. Since race is unknown   in our information system, the eGFR values for   -American and Non--American patients are given   for each creatinine result.        Eos # 0.2  0.1      Eosinophil% 1.1  0.7      Glucose   79      Glucose, UA  normal       Glucose, UA   Negative      Gran # 10.1(H)  10.9(H)      Gran% 73.0  73.0      Hematocrit 34.9(L)  35.1(L)      Hemoglobin 11.7(L)  12.0      Hyaline Casts, UA   0      Ketones, UA  - Negative      Leukocytes, UA   1+(A)      Lymph # 2.6  3.2      Lymph% 18.9  21.1      MCH 29.8  30.0      MCHC 33.5  34.2      MCV 89  88      Microscopic Comment   SEE COMMENT  Comment:  Other formed elements not mentioned in the report are not   present in the microscopic examination.         Mono # 0.9  0.8      Mono% 6.5  5.1      MPV 9.9  9.7      N gonorrhoeae, amplified DNA    Not Detected     Nitrite, UA   Positive(A)      Occult Blood UA   1+(A)      pH, UA   6.0      Platelet Estimate   Appears normal      Platelets 223  267      Potassium   3.7      Total Protein   7.2      Protein, UA   1+  Comment:  Recommend a 24 hour urine protein or a urine   protein/creatinine ratio if globulin induced proteinuria is  clinically suspected.  (A)      RBC 3.93(L)  4.00      RBC, UA  above 250 1      RDW 13.4  13.4      Sodium   138      Specific Grace, UA   1.025      Specimen UA   Urine, Clean Catch      Urobilinogen, UA   Negative      WBC, UA  + 20(H)      WBC 13.84(H)  15.05(H)          Physical Exam:   Constitutional: She is oriented to person, place, and time. She appears well-developed and well-nourished. No distress.    HENT:   Head: Normocephalic and atraumatic.     Eyes: EOM are normal. Pupils are equal, round, and reactive to light.    Neck: Normal range of motion. Neck supple.    Cardiovascular: Normal rate, regular rhythm and normal heart sounds.     Pulmonary/Chest: Effort normal and breath sounds normal. No respiratory distress.        Abdominal: Soft. Bowel sounds are normal. She exhibits no distension and no abdominal incision. There is no tenderness. There is CVA tenderness (right side). There is no rebound and no guarding.             Musculoskeletal: Normal range of motion. She exhibits no edema or tenderness.       Neurological: She is alert and oriented to person, place, and time.    Skin: Skin is warm and dry. No rash noted. She is not diaphoretic.    Psychiatric: She has a normal mood and affect. Her behavior is normal. Judgment and thought content normal.

## 2017-10-31 NOTE — HOSPITAL COURSE
10/30/2017: Admit to Antepartum for treatment of pyelonephritis. Start IV rocephin. Afebrile.  10/31/2017: HD #2: Doing well. Afebrile. Pain controlled with tylenol overnight. Reports significant pain this AM. No N/V overnight. Rocephin day #2.  11/01/2017 HD #3. Doin well. Afebrile. Pain controlled with tylenol PRN. No issues overnight. No n/v/chills. S/p rocephin x2. Tolerating PO. On maintenance IVF @ 125.

## 2017-10-31 NOTE — HPI
Shaye Bermudez is a 27 y.o. [id number] at 16w4d presenting from clinic with concern for possible pyelonephritis. Patient reports right flank pain for the past month. Had a positive urinalysis and was treated with macrobid with some relief. Symptoms now worse, pain 7/10 in severity. Constant, radiating to right groin. Also reporting nausea, vomiting, dizziness, weakness. Denies fevers, chills. Feels pressure in her bladder while urinating but no dysuria, urgency, frequency. Is noticing dark/pink tinged urine. This IUP is complicated by h/o 2 c sections. Of note she had pyelonephritis in a prior pregnancy requiring a 1 week inpatient stay. No h/o kidney stones.    In the ED, she was afebrile, but urinalysis was concerning for infection with nitrites, blood, and leukocytes. WBC elevated to 15.

## 2017-10-31 NOTE — PROGRESS NOTES
[facility]  Obstetrics  Antepartum Progress Note    Patient Name: Shaye Bermudez  MRN: [id number]  Admission Date: 10/30/2017  Hospital Length of Stay: 0 days  Attending Physician: Andrés Thornton MD  Primary Care Provider: Primary Doctor No    Subjective:     Principal Problem:Pyelonephritis affecting pregnancy    HPI:  Shaye Bermudez is a 27 y.o. [id number] at 16w4d presenting from clinic with concern for possible pyelonephritis. Patient reports right flank pain for the past month. Had a positive urinalysis and was treated with macrobid with some relief. Symptoms now worse, pain 7/10 in severity. Constant, radiating to right groin. Also reporting nausea, vomiting, dizziness, weakness. Denies fevers, chills. Feels pressure in her bladder while urinating but no dysuria, urgency, frequency. Is noticing dark/pink tinged urine. This IUP is complicated by h/o 2 c sections. Of note she had pyelonephritis in a prior pregnancy requiring a 1 week inpatient stay. No h/o kidney stones.    In the ED, she was afebrile, but urinalysis was concerning for infection with nitrites, blood, and leukocytes. WBC elevated to 15.     Hospital Course:  10/30/2017: Admit to Antepartum for treatment of pyelonephritis. Start IV rocephin. Afebrile.  10/31/2017: HD #2: Doing well. Afebrile. Pain controlled with tylenol overnight. Reports significant pain this AM. No N/V overnight. Rocephin day #2.    Obstetric HPI:  HD #2: Doing well. Afebrile. Pain controlled with tylenol overnight. Reports significant pain this AM. No N/V overnight. Rocephin day #2 today.    Patient reports None contractions, too early for fetal  movement, absent vaginal bleeding, absent loss of fluid      Objective:     Vital Signs (Most Recent):  Temp: 97.8 °F (36.6 °C) (10/31/17 040)  Pulse: 95 (10/31/17 0406)  Resp: 16 (10/31/17 040)  BP: (!) 110/57 (10/31/17 0406)  SpO2: 96 % (10/30/17 1627) Vital Signs (24h Range):  Temp:  [97 °F (36.1  °C)-98.9 °F (37.2 °C)] 97.8 °F (36.6 °C)  Pulse:  [] 95  Resp:  [14-16] 16  SpO2:  [96 %-98 %] 96 %  BP: (106-120)/(54-59) 110/57     Weight: 83.6 kg (184 lb 4.9 oz)  Body mass index is 29.75 kg/m².    FHT: verified on admit, FHT this shift pending    Intake/Output Summary (Last 24 hours) at 10/31/17 0639  Last data filed at 10/31/17 0600   Gross per 24 hour   Intake             1850 ml   Output                0 ml   Net             1850 ml     Significant Labs:  Recent Lab Results       10/31/17  0359 10/30/17  1656 10/30/17  1640 10/30/17  1337      Color, UA  keeley       Bilirubin  -       Spec Grav UA  1.015       pH, UA  5       Protein  30+       Urobilinogen, UA  normal       Nitrite, UA  +       Albumin   2.9(L)      Alkaline Phosphatase   121      ALT   19      Anion Gap   8      Appearance, UA   Clear      AST   12      Bacteria, UA   Many(A)      Baso # 0.02  0.02      Basophil% 0.1  0.1      Bilirubin (UA)   Negative      Total Bilirubin   0.3  Comment:  For infants and newborns, interpretation of results should be based  on gestational age, weight and in agreement with clinical  observations.  Premature Infant recommended reference ranges:  Up to 24 hours.............<8.0 mg/dL  Up to 48 hours............<12.0 mg/dL  3-5 days..................<15.0 mg/dL  6-29 days.................<15.0 mg/dL        BUN, Bld   8      Calcium   9.0      Chlamydia, Amplified DNA    Not Detected     Chloride   107      CO2   23      Color, UA   Yellow      Creatinine   0.6      Differential Method Automated  Automated      eGFR if    >60      eGFR if non    >60  Comment:  Calculation used to obtain the estimated glomerular filtration  rate (eGFR) is the CKD-EPI equation. Since race is unknown   in our information system, the eGFR values for   -American and Non--American patients are given   for each creatinine result.        Eos # 0.2  0.1      Eosinophil% 1.1  0.7       Glucose   79      Glucose, UA  normal       Glucose, UA   Negative      Gran # 10.1(H)  10.9(H)      Gran% 73.0  73.0      Hematocrit 34.9(L)  35.1(L)      Hemoglobin 11.7(L)  12.0      Hyaline Casts, UA   0      Ketones, UA  - Negative      Leukocytes, UA   1+(A)      Lymph # 2.6  3.2      Lymph% 18.9  21.1      MCH 29.8  30.0      MCHC 33.5  34.2      MCV 89  88      Microscopic Comment   SEE COMMENT  Comment:  Other formed elements not mentioned in the report are not   present in the microscopic examination.         Mono # 0.9  0.8      Mono% 6.5  5.1      MPV 9.9  9.7      N gonorrhoeae, amplified DNA    Not Detected     Nitrite, UA   Positive(A)      Occult Blood UA   1+(A)      pH, UA   6.0      Platelet Estimate   Appears normal      Platelets 223  267      Potassium   3.7      Total Protein   7.2      Protein, UA   1+  Comment:  Recommend a 24 hour urine protein or a urine   protein/creatinine ratio if globulin induced proteinuria is  clinically suspected.  (A)      RBC 3.93(L)  4.00      RBC, UA  above 250 1      RDW 13.4  13.4      Sodium   138      Specific Lena, UA   1.025      Specimen UA   Urine, Clean Catch      Urobilinogen, UA   Negative      WBC, UA  + 20(H)      WBC 13.84(H)  15.05(H)          Physical Exam:   Constitutional: She is oriented to person, place, and time. She appears well-developed and well-nourished. No distress.    HENT:   Head: Normocephalic and atraumatic.    Eyes: EOM are normal. Pupils are equal, round, and reactive to light.    Neck: Normal range of motion. Neck supple.    Cardiovascular: Normal rate, regular rhythm and normal heart sounds.     Pulmonary/Chest: Effort normal and breath sounds normal. No respiratory distress.        Abdominal: Soft. Bowel sounds are normal. She exhibits no distension and no abdominal incision. There is no tenderness. There is CVA tenderness (right side). There is no rebound and no guarding.             Musculoskeletal: Normal range of  motion. She exhibits no edema or tenderness.       Neurological: She is alert and oriented to person, place, and time.    Skin: Skin is warm and dry. No rash noted. She is not diaphoretic.    Psychiatric: She has a normal mood and affect. Her behavior is normal. Judgment and thought content normal.       Assessment/Plan:     27 y.o. female  at 16w5d for:    * Pyelonephritis affecting pregnancy    - rocephin day #2  - Tmax: 98.9; Pulse: [] 95  - UA 1+ occult blood, + nitrites, many bacteria; Urine culture pending  - CBC, BMP pending  - Will follow clinical picture for disposition        Supervision of normal pregnancy    - FHT q shift  - no acute needs  - PNV daily          Claudia Moya MD  Obstetrics  [facility]

## 2017-11-01 ENCOUNTER — PATIENT MESSAGE (OUTPATIENT)
Dept: OBSTETRICS AND GYNECOLOGY | Facility: CLINIC | Age: 27
End: 2017-11-01

## 2017-11-01 VITALS
BODY MASS INDEX: 29.62 KG/M2 | HEART RATE: 83 BPM | TEMPERATURE: 98 F | DIASTOLIC BLOOD PRESSURE: 56 MMHG | WEIGHT: 184.31 LBS | OXYGEN SATURATION: 100 % | SYSTOLIC BLOOD PRESSURE: 101 MMHG | HEIGHT: 66 IN | RESPIRATION RATE: 18 BRPM

## 2017-11-01 PROCEDURE — 99232 SBSQ HOSP IP/OBS MODERATE 35: CPT | Mod: ,,, | Performed by: OBSTETRICS & GYNECOLOGY

## 2017-11-01 PROCEDURE — 25000003 PHARM REV CODE 250: Performed by: STUDENT IN AN ORGANIZED HEALTH CARE EDUCATION/TRAINING PROGRAM

## 2017-11-01 RX ORDER — CEPHALEXIN 500 MG/1
500 CAPSULE ORAL EVERY 12 HOURS
Qty: 10 CAPSULE | Refills: 0 | Status: SHIPPED | OUTPATIENT
Start: 2017-11-01 | End: 2017-11-11

## 2017-11-01 RX ADMIN — PRENATAL VIT W/ FE FUMARATE-FA TAB 27-0.8 MG 1 EACH: 27-0.8 TAB at 08:11

## 2017-11-01 RX ADMIN — SODIUM CHLORIDE, SODIUM LACTATE, POTASSIUM CHLORIDE, AND CALCIUM CHLORIDE: .6; .31; .03; .02 INJECTION, SOLUTION INTRAVENOUS at 01:11

## 2017-11-01 RX ADMIN — CEPHALEXIN 500 MG: 500 CAPSULE ORAL at 08:11

## 2017-11-01 RX ADMIN — ACETAMINOPHEN 650 MG: 325 TABLET ORAL at 07:11

## 2017-11-01 RX ADMIN — ACETAMINOPHEN 650 MG: 325 TABLET ORAL at 01:11

## 2017-11-01 NOTE — PLAN OF CARE
Problem: Patient Care Overview  Goal: Plan of Care Review  Outcome: Ongoing (interventions implemented as appropriate)  No acute changes this shift. VSS. Pt afebrile. Pt c/o pain on the R side of her lower back 7/0. Pain relieved by Tylenol and heat application. Pt denies LOF, vaginal bleeding, or ctxs. FHTs verified with doppler,  bpm. Intake and output recorded in pt flowsheet. Pt rested well throughout night. Plan of care reviewed with pt. Pt has no further questions at this time. Will continue to monitor

## 2017-11-01 NOTE — SUBJECTIVE & OBJECTIVE
Obstetric HPI:  HD #3. Doin well. Afebrile. Pain controlled with tylenol PRN. No issues overnight. No n/v/chills. S/p rocephin x2. Tolerating PO. On maintenance IVF @ 125.      Patient reports None contractions, too early for fetal movement, absent vaginal bleeding , absent loss of fluid      Objective:     Vital Signs (Most Recent):  Temp: 97.8 °F (36.6 °C) (11/01/17 0416)  Pulse: 96 (11/01/17 0416)  Resp: 16 (11/01/17 0416)  BP: (!) 92/55 (11/01/17 0416)  SpO2: (!) 93 % (11/01/17 0416) Vital Signs (24h Range):  Temp:  [97.8 °F (36.6 °C)-100 °F (37.8 °C)] 97.8 °F (36.6 °C)  Pulse:  [] 96  Resp:  [16-18] 16  SpO2:  [93 %-99 %] 93 %  BP: ()/(51-63) 92/55     Weight: 83.6 kg (184 lb 4.9 oz)  Body mass index is 29.75 kg/m².    FHT: verified daily    Intake/Output Summary (Last 24 hours) at 11/01/17 0625  Last data filed at 11/01/17 0600   Gross per 24 hour   Intake          4350.42 ml   Output             4120 ml   Net           230.42 ml     Significant Labs:  Recent Lab Results     None        Physical Exam:   Constitutional: She is oriented to person, place, and time. She appears well-developed and well-nourished. No distress.    HENT:   Head: Normocephalic and atraumatic.    Eyes: EOM are normal. Pupils are equal, round, and reactive to light.    Neck: Normal range of motion. Neck supple.    Cardiovascular: Normal rate, regular rhythm and normal heart sounds.     Pulmonary/Chest: Effort normal and breath sounds normal. No respiratory distress.        Abdominal: Soft. Bowel sounds are normal. She exhibits no distension and no abdominal incision. There is no tenderness. There is no rebound and no guarding. CVA tenderness: improved on the right side; some mild paraspinal tenderness on the right with palpation.             Musculoskeletal: Normal range of motion. She exhibits no edema or tenderness.       Neurological: She is alert and oriented to person, place, and time.    Skin: Skin is warm and dry. No  rash noted. She is not diaphoretic.    Psychiatric: She has a normal mood and affect. Her behavior is normal. Judgment and thought content normal.

## 2017-11-01 NOTE — ASSESSMENT & PLAN NOTE
- s/p rocephin x2  - transition to keflex 500 mg BID x10 days  - patient to begin abx ppx macrobid daily following keflex course  - Temp:  [97.8 °F (36.6 °C)-100 °F (37.8 °C)] 97.8 °F (36.6 °C)  - UA 1+ occult blood, + nitrites, many bacteria; Urine culture still pending  - WBC 15 > 13, H/h stable

## 2017-11-01 NOTE — PROGRESS NOTES
[facility]  Obstetrics  Antepartum Progress Note    Patient Name: Shaye Bermudez  MRN: [id number]  Admission Date: 10/30/2017  Hospital Length of Stay: 1 days  Attending Physician: Andrés Thornton MD  Primary Care Provider: Primary Doctor No    Subjective:     Principal Problem:Pyelonephritis affecting pregnancy    HPI:  Shaye Bermudez is a 27 y.o. [id number] at 16w4d presenting from clinic with concern for possible pyelonephritis. Patient reports right flank pain for the past month. Had a positive urinalysis and was treated with macrobid with some relief. Symptoms now worse, pain 7/10 in severity. Constant, radiating to right groin. Also reporting nausea, vomiting, dizziness, weakness. Denies fevers, chills. Feels pressure in her bladder while urinating but no dysuria, urgency, frequency. Is noticing dark/pink tinged urine. This IUP is complicated by h/o 2 c sections. Of note she had pyelonephritis in a prior pregnancy requiring a 1 week inpatient stay. No h/o kidney stones.    In the ED, she was afebrile, but urinalysis was concerning for infection with nitrites, blood, and leukocytes. WBC elevated to 15.     Hospital Course:  10/30/2017: Admit to Antepartum for treatment of pyelonephritis. Start IV rocephin. Afebrile.  10/31/2017: HD #2: Doing well. Afebrile. Pain controlled with tylenol overnight. Reports significant pain this AM. No N/V overnight. Rocephin day #2.  2017 HD #3. Doin well. Afebrile. Pain controlled with tylenol PRN. No issues overnight. No n/v/chills. S/p rocephin x2. Tolerating PO. On maintenance IVF @ 125.      Obstetric HPI:  HD #3. Doin well. Afebrile. Pain controlled with tylenol PRN. No issues overnight. No n/v/chills. S/p rocephin x2. Tolerating PO. On maintenance IVF @ 125.      Patient reports None contractions, too early for fetal movement, absent vaginal bleeding , absent loss of fluid      Objective:     Vital Signs (Most Recent):  Temp: 97.8 °F  (36.6 °C) (17)  Pulse: 96 (17)  Resp: 16 (17)  BP: (!) 92/55 (17)  SpO2: (!) 93 % (17) Vital Signs (24h Range):  Temp:  [97.8 °F (36.6 °C)-100 °F (37.8 °C)] 97.8 °F (36.6 °C)  Pulse:  [] 96  Resp:  [16-18] 16  SpO2:  [93 %-99 %] 93 %  BP: ()/(51-63) 92/55     Weight: 83.6 kg (184 lb 4.9 oz)  Body mass index is 29.75 kg/m².    FHT: verified daily    Intake/Output Summary (Last 24 hours) at 17 0625  Last data filed at 17 0600   Gross per 24 hour   Intake          4350.42 ml   Output             4120 ml   Net           230.42 ml     Significant Labs:  Recent Lab Results     None        Physical Exam:   Constitutional: She is oriented to person, place, and time. She appears well-developed and well-nourished. No distress.    HENT:   Head: Normocephalic and atraumatic.    Eyes: EOM are normal. Pupils are equal, round, and reactive to light.    Neck: Normal range of motion. Neck supple.    Cardiovascular: Normal rate, regular rhythm and normal heart sounds.     Pulmonary/Chest: Effort normal and breath sounds normal. No respiratory distress.        Abdominal: Soft. Bowel sounds are normal. She exhibits no distension and no abdominal incision. There is no tenderness. There is no rebound and no guarding. CVA tenderness: improved on the right side; some mild paraspinal tenderness on the right with palpation.             Musculoskeletal: Normal range of motion. She exhibits no edema or tenderness.       Neurological: She is alert and oriented to person, place, and time.    Skin: Skin is warm and dry. No rash noted. She is not diaphoretic.    Psychiatric: She has a normal mood and affect. Her behavior is normal. Judgment and thought content normal.       Assessment/Plan:     27 y.o. female  at 16w6d for:    * Pyelonephritis affecting pregnancy    - s/p rocephin x2  - transition to keflex 500 mg BID x10 days  - patient to begin abx ppx macrobid  daily following keflex course  - Temp:  [97.8 °F (36.6 °C)-100 °F (37.8 °C)] 97.8 °F (36.6 °C)  - UA 1+ occult blood, + nitrites, many bacteria; Urine culture still pending  - WBC 15 > 13, H/h stable          Supervision of normal pregnancy    - FHT q shift  - no acute needs  - PNV daily          Claudia Moya MD  Obstetrics  [facility]

## 2017-11-01 NOTE — DISCHARGE SUMMARY
[facility]  Obstetrics  Discharge Summary      Patient Name: Shaye Bermudez  MRN: [id number]  Admission Date: 10/30/2017  Hospital Length of Stay: 1 days  Discharge Date and Time:  2017 11:41 AM  Attending Physician: Andrés Thornton MD   Discharging Provider: Claudia Moya MD  Primary Care Provider: Primary Doctor No    HPI: Shaye Bermudez is a 27 y.o. [id number] at 16w4d presenting from clinic with concern for possible pyelonephritis. Patient reports right flank pain for the past month. Had a positive urinalysis and was treated with macrobid with some relief. Symptoms now worse, pain 7/10 in severity. Constant, radiating to right groin. Also reporting nausea, vomiting, dizziness, weakness. Denies fevers, chills. Feels pressure in her bladder while urinating but no dysuria, urgency, frequency. Is noticing dark/pink tinged urine. This IUP is complicated by h/o 2 c sections. Of note she had pyelonephritis in a prior pregnancy requiring a 1 week inpatient stay. No h/o kidney stones.    In the ED, she was afebrile, but urinalysis was concerning for infection with nitrites, blood, and leukocytes. WBC elevated to 15.     * No surgery found *     Hospital Course:   10/30/2017: Admit to Antepartum for treatment of pyelonephritis. Start IV rocephin. Afebrile.  10/31/2017: HD #2: Doing well. Afebrile. Pain controlled with tylenol overnight. Reports significant pain this AM. No N/V overnight. Rocephin day #2.  2017 HD #3. Doing well. Afebrile. Pain controlled with tylenol PRN. No issues overnight. No n/v/chills. S/p rocephin x2. Tolerating PO. On maintenance IVF @ 125.  Patient is medically stable for discharge today.    Final Active Diagnoses:    Diagnosis Date Noted POA    PRINCIPAL PROBLEM:  Pyelonephritis affecting pregnancy [O23.00] 10/30/2017 Yes    Supervision of normal pregnancy [Z34.90] 10/24/2014 Not Applicable      Problems Resolved During this Admission:     Diagnosis Date Noted Date Resolved POA      Labs:   CBC   Recent Labs  Lab 10/30/17  1640 10/31/17  0359   WBC 15.05* 13.84*   HGB 12.0 11.7*   HCT 35.1* 34.9*    223     Feeding Method: breast    Immunizations     Date Immunization Status Dose Route/Site Given by    10/30/17 2122 Influenza - Quadrivalent - PF (3 years & older) Incomplete 0.5 mL Intramuscular/Left deltoid         This patient has no babies on file.  Pending Diagnostic Studies:     None        Discharged Condition: good    Disposition: Home or Self Care    Follow Up:  Follow-up Information     Andrés Thornton MD.    Specialties:  Obstetrics, Obstetrics and Gynecology  Why:  prenatal appointment as scheduled  Contact information:  [address] [id number] [phone]                 Patient Instructions:     Diet general     Activity as tolerated     Call MD for:  increased confusion or weakness     Call MD for:  persistent dizziness, light-headedness, or visual disturbances     Call MD for:  worsening rash     Call MD for:  severe persistent headache     Call MD for:  severe uncontrolled pain     Call MD for:  persistent nausea and vomiting or diarrhea     Call MD for:  temperature >100.4     Call MD for:  difficulty breathing or increased cough       Medications:  Current Discharge Medication List      START taking these medications    Details   cephALEXin (KEFLEX) 500 MG capsule Take 1 capsule (500 mg total) by mouth every 12 (twelve) hours.  Qty: 10 capsule, Refills: 0    Associated Diagnoses: Pyelonephritis affecting pregnancy; Encounter for supervision of normal pregnancy in second trimester, unspecified       nitrofurantoin, macrocrystal-monohydrate, (MACROBID) 100 MG capsule Take 1 capsule (100 mg total) by mouth once daily.  Qty: 195 capsule, Refills: 0    Associated Diagnoses: Pyelonephritis affecting pregnancy; Encounter for supervision of normal pregnancy in second trimester, unspecified             Claudia Moya MD  Obstetrics  [facility]

## 2017-11-01 NOTE — NURSING
Pt declined flu vaccine.  Printed pt's discharge summary and went over medication instructions with her.  IV removed from right forearm.  All questions answered.  Pt escorted out by her .

## 2017-11-02 ENCOUNTER — TELEPHONE (OUTPATIENT)
Dept: OBSTETRICS AND GYNECOLOGY | Facility: CLINIC | Age: 27
End: 2017-11-02

## 2017-11-02 ENCOUNTER — PATIENT MESSAGE (OUTPATIENT)
Dept: OBSTETRICS AND GYNECOLOGY | Facility: CLINIC | Age: 27
End: 2017-11-02

## 2017-11-02 LAB — BACTERIA UR CULT: NORMAL

## 2017-11-02 NOTE — TELEPHONE ENCOUNTER
----- Message from Fay Samuel sent at 11/2/2017  8:37 AM CDT -----  X_  1st Request  _  2nd Request  _  3rd Request        Who: WILFRID TENORIO [[id number]]    Why: Requesting a call back in regards to getting a note for work. She states she would like get an excuse for work for the hospital visit. Please call to discuss.    What Number to Call Back:[phone]    When to Expect a call back: (Within 24 hours)    Please return the call at earliest convenience. Thanks!

## 2017-11-09 ENCOUNTER — TELEPHONE (OUTPATIENT)
Dept: OBSTETRICS AND GYNECOLOGY | Facility: CLINIC | Age: 27
End: 2017-11-09

## 2017-11-09 ENCOUNTER — PATIENT MESSAGE (OUTPATIENT)
Dept: OBSTETRICS AND GYNECOLOGY | Facility: CLINIC | Age: 27
End: 2017-11-09

## 2017-11-09 NOTE — TELEPHONE ENCOUNTER
Visit Follow-Up     Shaye Thornton MD 2 hours ago (1:55 PM)         Hello! I've been taking my antibiotics religiously, but I have started to feel intense right flank pain again and I wanted to touch base to see how to proceed.  I don't know if I should be worried or not, but it's painful to take deep breaths again...          Shaye Thornton MD 3 days ago         Yes ma'am          You   Shaye Bermudez 3 days ago         Same fax?          You   Shaye Bermudez 3 days ago         Sorry, I will correct it,

## 2017-11-10 NOTE — TELEPHONE ENCOUNTER
I will let Dr Thornton know. Aleta  ===View-only below this line===      ----- Message -----     From: Shaye Bermudez     Sent: 11/9/2017  7:25 PM CST       To: Sudhir River  Subject: RE:Response    I'm sorry! I'm still at work and my phone doesn't ring inside the building. I just got his voicemail. I took some Tylenol and it eased up, but if it's still hurting tomorrow like it was today I'll head right over  ----- Message -----  From: Sudhir River  Sent: 11/9/2017  5:47 PM CST  To: Shaye Bermudez  Subject: Response  Dr Thornton would like you to back to L&D. He was trying to call you. Please let me know you received this message. Aleta

## 2017-11-10 NOTE — TELEPHONE ENCOUNTER
----- Message from Fay Samuel sent at 11/10/2017  1:05 PM CST -----  X  1st Request  _  2nd Request  _  3rd Request        Who: WILFRID TENORIO [[id number]]    Why: Pt is returning a call from Dr. Thornton. Please call back.    What Number to Call Back:[phone]    When to Expect a call back: (Within 24 hours)    Please return the call at earliest convenience. Thanks!

## 2017-11-20 ENCOUNTER — ROUTINE PRENATAL (OUTPATIENT)
Dept: OBSTETRICS AND GYNECOLOGY | Facility: CLINIC | Age: 27
End: 2017-11-20
Attending: OBSTETRICS & GYNECOLOGY
Payer: MEDICAID

## 2017-11-20 VITALS
BODY MASS INDEX: 30.96 KG/M2 | SYSTOLIC BLOOD PRESSURE: 115 MMHG | DIASTOLIC BLOOD PRESSURE: 54 MMHG | WEIGHT: 191.81 LBS

## 2017-11-20 DIAGNOSIS — O34.219 PREVIOUS CESAREAN DELIVERY AFFECTING PREGNANCY, ANTEPARTUM: Primary | ICD-10-CM

## 2017-11-20 DIAGNOSIS — N76.0 ACUTE VAGINITIS: ICD-10-CM

## 2017-11-20 LAB
CANDIDA RRNA VAG QL PROBE: NEGATIVE
G VAGINALIS RRNA GENITAL QL PROBE: NEGATIVE
T VAGINALIS RRNA GENITAL QL PROBE: NEGATIVE

## 2017-11-20 PROCEDURE — 99999 PR PBB SHADOW E&M-EST. PATIENT-LVL II: CPT | Mod: PBBFAC,,, | Performed by: OBSTETRICS & GYNECOLOGY

## 2017-11-20 PROCEDURE — 87480 CANDIDA DNA DIR PROBE: CPT

## 2017-11-20 PROCEDURE — 87660 TRICHOMONAS VAGIN DIR PROBE: CPT

## 2017-11-20 PROCEDURE — 99212 OFFICE O/P EST SF 10 MIN: CPT | Mod: PBBFAC,PN | Performed by: OBSTETRICS & GYNECOLOGY

## 2017-11-20 PROCEDURE — 99213 OFFICE O/P EST LOW 20 MIN: CPT | Mod: TH,S$PBB,, | Performed by: OBSTETRICS & GYNECOLOGY

## 2017-11-20 NOTE — PROGRESS NOTES
Doing well since admit for pyelo- back pain has resolved, no urinary symptoms.  Taking Macrobid q hs for prophylaxis.  Reports +FM.  No bleeding.  No cramping.  No CVA tenderness.  Recently notes vulva itching.  Affirm performed.  M anatomic survey tomorrow.  She has decreased cigarette usage to 0-1 cigarettes / day.  She plans to completely quit.  Return 4 weeks.

## 2017-11-21 ENCOUNTER — PATIENT MESSAGE (OUTPATIENT)
Dept: OBSTETRICS AND GYNECOLOGY | Facility: CLINIC | Age: 27
End: 2017-11-21

## 2017-11-21 ENCOUNTER — OFFICE VISIT (OUTPATIENT)
Dept: MATERNAL FETAL MEDICINE | Facility: CLINIC | Age: 27
End: 2017-11-21
Attending: OBSTETRICS & GYNECOLOGY
Payer: MEDICAID

## 2017-11-21 DIAGNOSIS — O34.219 PREVIOUS CESAREAN DELIVERY AFFECTING PREGNANCY, ANTEPARTUM: ICD-10-CM

## 2017-11-21 DIAGNOSIS — Z36.89 ENCOUNTER FOR FETAL ANATOMIC SURVEY: ICD-10-CM

## 2017-11-21 DIAGNOSIS — Z36.89 ENCOUNTER FOR ULTRASOUND TO CHECK FETAL GROWTH: Primary | ICD-10-CM

## 2017-11-21 PROCEDURE — 76805 OB US >/= 14 WKS SNGL FETUS: CPT | Mod: 26,S$PBB,, | Performed by: OBSTETRICS & GYNECOLOGY

## 2017-11-21 PROCEDURE — 99499 UNLISTED E&M SERVICE: CPT | Mod: S$PBB,,, | Performed by: OBSTETRICS & GYNECOLOGY

## 2017-11-21 PROCEDURE — 76805 OB US >/= 14 WKS SNGL FETUS: CPT | Mod: PBBFAC | Performed by: OBSTETRICS & GYNECOLOGY

## 2017-11-22 ENCOUNTER — TELEPHONE (OUTPATIENT)
Dept: OBSTETRICS AND GYNECOLOGY | Facility: CLINIC | Age: 27
End: 2017-11-22

## 2017-11-22 NOTE — TELEPHONE ENCOUNTER
Called patient:    Message left to call us.    [Affirm negative - for external itching, she may use Monistat cream]

## 2017-11-22 NOTE — TELEPHONE ENCOUNTER
Called patient:    Aware of negative affirm.    Reports change in body wash prior to development of external itching.    Rec: return to previous soap.

## 2017-12-18 ENCOUNTER — ROUTINE PRENATAL (OUTPATIENT)
Dept: OBSTETRICS AND GYNECOLOGY | Facility: CLINIC | Age: 27
End: 2017-12-18
Attending: OBSTETRICS & GYNECOLOGY

## 2017-12-18 VITALS — WEIGHT: 195.75 LBS | DIASTOLIC BLOOD PRESSURE: 76 MMHG | SYSTOLIC BLOOD PRESSURE: 130 MMHG | BODY MASS INDEX: 31.6 KG/M2

## 2017-12-18 DIAGNOSIS — O34.219 PREVIOUS CESAREAN DELIVERY AFFECTING PREGNANCY, ANTEPARTUM: Primary | ICD-10-CM

## 2017-12-18 PROCEDURE — 0502F SUBSEQUENT PRENATAL CARE: CPT | Mod: S$GLB,,, | Performed by: OBSTETRICS & GYNECOLOGY

## 2017-12-18 PROCEDURE — 99999 PR PBB SHADOW E&M-EST. PATIENT-LVL II: CPT | Mod: PBBFAC,,, | Performed by: OBSTETRICS & GYNECOLOGY

## 2017-12-18 NOTE — PROGRESS NOTES
No complaints.  +FM.  No bleeding.  Denies cramping.  Reviewed 19 week MFM sono.  Follow-up MFM sono to complete anatomic survey tomorrow.  Now, she is decreased to smoking several cigarettes / day - we discussed the importance of quitting completely.  DM screen, CBC, OB check in 4 weeks.

## 2017-12-19 ENCOUNTER — OFFICE VISIT (OUTPATIENT)
Dept: MATERNAL FETAL MEDICINE | Facility: CLINIC | Age: 27
End: 2017-12-19
Payer: MEDICAID

## 2017-12-19 DIAGNOSIS — Z36.89 ENCOUNTER FOR ULTRASOUND TO CHECK FETAL GROWTH: ICD-10-CM

## 2017-12-19 PROCEDURE — 76816 OB US FOLLOW-UP PER FETUS: CPT | Mod: PBBFAC | Performed by: OBSTETRICS & GYNECOLOGY

## 2017-12-19 PROCEDURE — 76816 OB US FOLLOW-UP PER FETUS: CPT | Mod: 26,S$PBB,, | Performed by: OBSTETRICS & GYNECOLOGY

## 2017-12-19 PROCEDURE — 99499 UNLISTED E&M SERVICE: CPT | Mod: S$PBB,,, | Performed by: OBSTETRICS & GYNECOLOGY

## 2017-12-19 NOTE — PROGRESS NOTES
"OB Ultrasound Report (see PDF version under imaging tab)    Indication  ========    Follow-up evaluation of anatomy and growth.    History  ======    General History  Other: BRI MARTIN  Previous Outcomes   8  Para 2  Abortions (A) 5  Miscarriages 5    Pregnancy History  ===============    Maternal Lab Tests  Genetic screening declined.      Method  ======    Transabdominal ultrasound examination. View: Good view.    Pregnancy  =========    Kilpatrick pregnancy. Number of fetuses: 1.    Dating  ======    LMP on: 2017  Cycle: regular cycle  GA by LMP 23 w + 5 d  CANDIDA by LMP: 2018  GA by "stated dating" 23 w + 5 d  CANDIDA by "stated dating": 2018  Ultrasound examination on: 2017  GA by U/S based upon: AC, BPD, Femur, HC  GA by U/S 23 w + 5 d  CANDIDA by U/S: 2018  Assigned: Dating performed on 2017, based on the external assessment  Assigned GA 23 w + 5 d  Assigned CANDIDA: 2018    General Evaluation  ===============    Cardiac activity: present.  bpm.  Fetal movements: visualized.  Presentation: cephalic.  Placenta:  Placental site: posterior.  Umbilical cord: Cord vessels: 3 vessel cord.  Amniotic fluid: MVP 5.6 cm.    Fetal Biometry  ===========    Fetal Biometry  BPD 57.3 mm 23w 4d Hadlock  OFD 77.5 mm 25w 3d Evelyne  .0 mm 23w 5d Hadlock  .9 mm 24w 3d Hadlock  Femur 41.4 mm 23w 3d Hadlock   g 48% Jassi  Calculated by: Hadlock (BPD-HC-AC-FL)  EFW (lb) 1 lb  EFW (oz) 7 oz  Cephalic index 0.74  HC / AC 1.10  FL / BPD 0.72  FL / AC 0.21  MVP 5.6 cm   bpm  Head / Face / Neck   8.1 mm    Fetal Anatomy  ===========    Cranium: normal  Lateral ventricles: normal  Cerebellum: normal  Cisterna magna: normal  Profile: normal  4-chamber view: normal  Stomach: normal  Kidneys: normal  Bladder: normal  Genitals: normal  Rt renal artery: visualized  Lt renal artery: visualized  Cervical spine: normal  Thoracic spine: normal  Lumbar spine: normal  Sacral " spine: normal  Gender: female  Wants to know gender: yes    Maternal Structures  ===============    Ovaries / Tubes / Adnexa  Rt ovary: Visualized  Lt ovary: Visualized    Impression  =========    A follow up fetal anatomical ultrasound was completed today.  The fetal anatomic survey was completed today, and no fetal structural abnormalities were noted. Interval fetal growth has been  normal, and the AFV is normal.  F/U as clinically indicated.

## 2018-01-25 ENCOUNTER — PATIENT MESSAGE (OUTPATIENT)
Dept: OBSTETRICS AND GYNECOLOGY | Facility: CLINIC | Age: 28
End: 2018-01-25

## 2018-01-25 ENCOUNTER — LAB VISIT (OUTPATIENT)
Dept: LAB | Facility: OTHER | Age: 28
End: 2018-01-25
Attending: OBSTETRICS & GYNECOLOGY
Payer: MEDICAID

## 2018-01-25 ENCOUNTER — ROUTINE PRENATAL (OUTPATIENT)
Dept: OBSTETRICS AND GYNECOLOGY | Facility: CLINIC | Age: 28
End: 2018-01-25
Attending: OBSTETRICS & GYNECOLOGY
Payer: MEDICAID

## 2018-01-25 VITALS — WEIGHT: 200.81 LBS | DIASTOLIC BLOOD PRESSURE: 58 MMHG | BODY MASS INDEX: 32.42 KG/M2 | SYSTOLIC BLOOD PRESSURE: 90 MMHG

## 2018-01-25 DIAGNOSIS — O34.219 PREVIOUS CESAREAN DELIVERY AFFECTING PREGNANCY, ANTEPARTUM: ICD-10-CM

## 2018-01-25 DIAGNOSIS — O34.219 PREVIOUS CESAREAN DELIVERY AFFECTING PREGNANCY, ANTEPARTUM: Primary | ICD-10-CM

## 2018-01-25 LAB
ANISOCYTOSIS BLD QL SMEAR: SLIGHT
BASOPHILS # BLD AUTO: 0.02 K/UL
BASOPHILS NFR BLD: 0.1 %
DIFFERENTIAL METHOD: ABNORMAL
EOSINOPHIL # BLD AUTO: 0.2 K/UL
EOSINOPHIL NFR BLD: 1.2 %
ERYTHROCYTE [DISTWIDTH] IN BLOOD BY AUTOMATED COUNT: 14 %
GLUCOSE SERPL-MCNC: 117 MG/DL
HCT VFR BLD AUTO: 37.1 %
HGB BLD-MCNC: 12.4 G/DL
HYPOCHROMIA BLD QL SMEAR: ABNORMAL
LYMPHOCYTES # BLD AUTO: 4 K/UL
LYMPHOCYTES NFR BLD: 22.9 %
MCH RBC QN AUTO: 30.2 PG
MCHC RBC AUTO-ENTMCNC: 33.4 G/DL
MCV RBC AUTO: 91 FL
MONOCYTES # BLD AUTO: 0.7 K/UL
MONOCYTES NFR BLD: 3.9 %
NEUTROPHILS # BLD AUTO: 12.5 K/UL
NEUTROPHILS NFR BLD: 71.9 %
OVALOCYTES BLD QL SMEAR: ABNORMAL
PLATELET # BLD AUTO: 324 K/UL
PLATELET BLD QL SMEAR: ABNORMAL
PMV BLD AUTO: 10.6 FL
POIKILOCYTOSIS BLD QL SMEAR: SLIGHT
RBC # BLD AUTO: 4.1 M/UL
WBC # BLD AUTO: 17.63 K/UL

## 2018-01-25 PROCEDURE — 36415 COLL VENOUS BLD VENIPUNCTURE: CPT

## 2018-01-25 PROCEDURE — 99212 OFFICE O/P EST SF 10 MIN: CPT | Mod: PBBFAC,TH | Performed by: OBSTETRICS & GYNECOLOGY

## 2018-01-25 PROCEDURE — 85025 COMPLETE CBC W/AUTO DIFF WBC: CPT

## 2018-01-25 PROCEDURE — 99213 OFFICE O/P EST LOW 20 MIN: CPT | Mod: TH,S$PBB,, | Performed by: OBSTETRICS & GYNECOLOGY

## 2018-01-25 PROCEDURE — 99999 PR PBB SHADOW E&M-EST. PATIENT-LVL II: CPT | Mod: PBBFAC,,, | Performed by: OBSTETRICS & GYNECOLOGY

## 2018-01-25 PROCEDURE — 82950 GLUCOSE TEST: CPT

## 2018-01-25 NOTE — PROGRESS NOTES
No complaints.  Good FM.  No bleeding or cramping.  We discussed scheduling repeat C/S - 4/6/18 at 7:30am.   DM screen, CBC today.  Return 3 weeks.

## 2018-02-12 ENCOUNTER — ROUTINE PRENATAL (OUTPATIENT)
Dept: OBSTETRICS AND GYNECOLOGY | Facility: CLINIC | Age: 28
End: 2018-02-12
Attending: OBSTETRICS & GYNECOLOGY
Payer: MEDICAID

## 2018-02-12 VITALS
SYSTOLIC BLOOD PRESSURE: 118 MMHG | WEIGHT: 204.25 LBS | BODY MASS INDEX: 32.97 KG/M2 | DIASTOLIC BLOOD PRESSURE: 70 MMHG

## 2018-02-12 DIAGNOSIS — O34.219 PREVIOUS CESAREAN DELIVERY AFFECTING PREGNANCY, ANTEPARTUM: Primary | ICD-10-CM

## 2018-02-12 PROCEDURE — 99213 OFFICE O/P EST LOW 20 MIN: CPT | Mod: TH,S$PBB,, | Performed by: OBSTETRICS & GYNECOLOGY

## 2018-02-12 PROCEDURE — 99999 PR PBB SHADOW E&M-EST. PATIENT-LVL II: CPT | Mod: PBBFAC,,, | Performed by: OBSTETRICS & GYNECOLOGY

## 2018-02-12 PROCEDURE — 99212 OFFICE O/P EST SF 10 MIN: CPT | Mod: PBBFAC,TH,PN | Performed by: OBSTETRICS & GYNECOLOGY

## 2018-02-12 PROCEDURE — 3008F BODY MASS INDEX DOCD: CPT | Mod: ,,, | Performed by: OBSTETRICS & GYNECOLOGY

## 2018-02-12 NOTE — PROGRESS NOTES
Good FM.  No bleeding.  Denies CTX.  Reports some right hip discomfort - discussed.  Aware of normal DM screen.  Reports smoking 3 cigarettes / week - discussed quitting.  Return 2 weeks.  [facility] bid.

## 2018-02-26 ENCOUNTER — ROUTINE PRENATAL (OUTPATIENT)
Dept: OBSTETRICS AND GYNECOLOGY | Facility: CLINIC | Age: 28
End: 2018-02-26
Attending: OBSTETRICS & GYNECOLOGY
Payer: MEDICAID

## 2018-02-26 VITALS
DIASTOLIC BLOOD PRESSURE: 72 MMHG | SYSTOLIC BLOOD PRESSURE: 131 MMHG | WEIGHT: 203.69 LBS | BODY MASS INDEX: 32.88 KG/M2

## 2018-02-26 DIAGNOSIS — O34.219 PREVIOUS CESAREAN DELIVERY AFFECTING PREGNANCY, ANTEPARTUM: Primary | ICD-10-CM

## 2018-02-26 DIAGNOSIS — R82.90 ABNORMAL URINE: ICD-10-CM

## 2018-02-26 PROCEDURE — 99213 OFFICE O/P EST LOW 20 MIN: CPT | Mod: TH,S$PBB,, | Performed by: OBSTETRICS & GYNECOLOGY

## 2018-02-26 PROCEDURE — 87086 URINE CULTURE/COLONY COUNT: CPT

## 2018-02-26 PROCEDURE — 3008F BODY MASS INDEX DOCD: CPT | Mod: ,,, | Performed by: OBSTETRICS & GYNECOLOGY

## 2018-02-26 PROCEDURE — 99212 OFFICE O/P EST SF 10 MIN: CPT | Mod: PBBFAC,TH,PN | Performed by: OBSTETRICS & GYNECOLOGY

## 2018-02-26 PROCEDURE — 99999 PR PBB SHADOW E&M-EST. PATIENT-LVL II: CPT | Mod: PBBFAC,,, | Performed by: OBSTETRICS & GYNECOLOGY

## 2018-02-26 NOTE — PROGRESS NOTES
Good FM.  No bleeding.  Denies CTX.  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  Urine with strong odor and ?nitrites - will check urine culture.  Return 2 weeks.  [facility] bid.

## 2018-02-27 ENCOUNTER — PATIENT MESSAGE (OUTPATIENT)
Dept: OBSTETRICS AND GYNECOLOGY | Facility: CLINIC | Age: 28
End: 2018-02-27

## 2018-02-27 LAB
BACTERIA UR CULT: NORMAL
BACTERIA UR CULT: NORMAL

## 2018-02-28 ENCOUNTER — PATIENT MESSAGE (OUTPATIENT)
Dept: OBSTETRICS AND GYNECOLOGY | Facility: CLINIC | Age: 28
End: 2018-02-28

## 2018-03-12 ENCOUNTER — ROUTINE PRENATAL (OUTPATIENT)
Dept: OBSTETRICS AND GYNECOLOGY | Facility: CLINIC | Age: 28
End: 2018-03-12
Attending: OBSTETRICS & GYNECOLOGY
Payer: MEDICAID

## 2018-03-12 VITALS
WEIGHT: 205.38 LBS | SYSTOLIC BLOOD PRESSURE: 110 MMHG | BODY MASS INDEX: 33.15 KG/M2 | DIASTOLIC BLOOD PRESSURE: 64 MMHG

## 2018-03-12 DIAGNOSIS — O34.219 PREVIOUS CESAREAN DELIVERY AFFECTING PREGNANCY, ANTEPARTUM: Primary | ICD-10-CM

## 2018-03-12 PROCEDURE — 99999 PR PBB SHADOW E&M-EST. PATIENT-LVL II: CPT | Mod: PBBFAC,,, | Performed by: OBSTETRICS & GYNECOLOGY

## 2018-03-12 PROCEDURE — 99213 OFFICE O/P EST LOW 20 MIN: CPT | Mod: TH,S$PBB,, | Performed by: OBSTETRICS & GYNECOLOGY

## 2018-03-12 PROCEDURE — 99212 OFFICE O/P EST SF 10 MIN: CPT | Mod: PBBFAC,TH,PN | Performed by: OBSTETRICS & GYNECOLOGY

## 2018-03-12 PROCEDURE — 87081 CULTURE SCREEN ONLY: CPT

## 2018-03-12 NOTE — PROGRESS NOTES
Good FM.  No bleeding.  Reports occasional CTX yesterday that now seem to have resolved.  GBBS performed.  Cx: 1/ thick / posterior.  Describes sore throat, low grade temp over the weekend that has now resolved.  Still with some nasal congestion- discussed.  T3 labs today.  Return 1 week.  Precautions reviewed.  [facility] bid.

## 2018-03-14 ENCOUNTER — LAB VISIT (OUTPATIENT)
Dept: LAB | Facility: HOSPITAL | Age: 28
End: 2018-03-14
Attending: OBSTETRICS & GYNECOLOGY
Payer: MEDICAID

## 2018-03-14 ENCOUNTER — PATIENT MESSAGE (OUTPATIENT)
Dept: OBSTETRICS AND GYNECOLOGY | Facility: CLINIC | Age: 28
End: 2018-03-14

## 2018-03-14 DIAGNOSIS — O34.219 PREVIOUS CESAREAN DELIVERY AFFECTING PREGNANCY, ANTEPARTUM: ICD-10-CM

## 2018-03-14 LAB
BACTERIA SPEC AEROBE CULT: NORMAL
BASOPHILS # BLD AUTO: 0.05 K/UL
BASOPHILS NFR BLD: 0.3 %
DIFFERENTIAL METHOD: ABNORMAL
EOSINOPHIL # BLD AUTO: 0.2 K/UL
EOSINOPHIL NFR BLD: 1 %
ERYTHROCYTE [DISTWIDTH] IN BLOOD BY AUTOMATED COUNT: 13.8 %
HCT VFR BLD AUTO: 37.2 %
HGB BLD-MCNC: 12.9 G/DL
HIV 1+2 AB+HIV1 P24 AG SERPL QL IA: NEGATIVE
IMM GRANULOCYTES # BLD AUTO: 0.12 K/UL
IMM GRANULOCYTES NFR BLD AUTO: 0.7 %
LYMPHOCYTES # BLD AUTO: 3.6 K/UL
LYMPHOCYTES NFR BLD: 21.7 %
MCH RBC QN AUTO: 30.8 PG
MCHC RBC AUTO-ENTMCNC: 34.7 G/DL
MCV RBC AUTO: 89 FL
MONOCYTES # BLD AUTO: 0.7 K/UL
MONOCYTES NFR BLD: 4.3 %
NEUTROPHILS # BLD AUTO: 11.9 K/UL
NEUTROPHILS NFR BLD: 72 %
NRBC BLD-RTO: 0 /100 WBC
PLATELET # BLD AUTO: 264 K/UL
PMV BLD AUTO: 10.9 FL
RBC # BLD AUTO: 4.19 M/UL
WBC # BLD AUTO: 16.53 K/UL

## 2018-03-14 PROCEDURE — 86703 HIV-1/HIV-2 1 RESULT ANTBDY: CPT

## 2018-03-14 PROCEDURE — 86592 SYPHILIS TEST NON-TREP QUAL: CPT

## 2018-03-14 PROCEDURE — 36415 COLL VENOUS BLD VENIPUNCTURE: CPT | Mod: PO

## 2018-03-14 PROCEDURE — 85025 COMPLETE CBC W/AUTO DIFF WBC: CPT

## 2018-03-15 LAB — RPR SER QL: NORMAL

## 2018-03-19 ENCOUNTER — ROUTINE PRENATAL (OUTPATIENT)
Dept: OBSTETRICS AND GYNECOLOGY | Facility: CLINIC | Age: 28
End: 2018-03-19
Attending: OBSTETRICS & GYNECOLOGY
Payer: MEDICAID

## 2018-03-19 VITALS
WEIGHT: 203.25 LBS | BODY MASS INDEX: 32.81 KG/M2 | SYSTOLIC BLOOD PRESSURE: 114 MMHG | DIASTOLIC BLOOD PRESSURE: 74 MMHG

## 2018-03-19 DIAGNOSIS — O34.219 PREVIOUS CESAREAN DELIVERY AFFECTING PREGNANCY, ANTEPARTUM: Primary | ICD-10-CM

## 2018-03-19 PROCEDURE — 99999 PR PBB SHADOW E&M-EST. PATIENT-LVL II: CPT | Mod: PBBFAC,,, | Performed by: OBSTETRICS & GYNECOLOGY

## 2018-03-19 PROCEDURE — 99212 OFFICE O/P EST SF 10 MIN: CPT | Mod: PBBFAC,TH,PN | Performed by: OBSTETRICS & GYNECOLOGY

## 2018-03-19 PROCEDURE — 99213 OFFICE O/P EST LOW 20 MIN: CPT | Mod: TH,S$PBB,, | Performed by: OBSTETRICS & GYNECOLOGY

## 2018-03-19 NOTE — PROGRESS NOTES
No complaints.  Good FM.  No bleeding.  Denies CTX.  Cx: 1-2/ 30/ -4, vertex.  She is no longer smoking.  Return 1 week.  [facility] bid.

## 2018-03-26 ENCOUNTER — ROUTINE PRENATAL (OUTPATIENT)
Dept: OBSTETRICS AND GYNECOLOGY | Facility: CLINIC | Age: 28
End: 2018-03-26
Attending: OBSTETRICS & GYNECOLOGY
Payer: MEDICAID

## 2018-03-26 VITALS — SYSTOLIC BLOOD PRESSURE: 110 MMHG | BODY MASS INDEX: 33.16 KG/M2 | DIASTOLIC BLOOD PRESSURE: 78 MMHG | WEIGHT: 205.5 LBS

## 2018-03-26 DIAGNOSIS — O34.219 PREVIOUS CESAREAN DELIVERY AFFECTING PREGNANCY, ANTEPARTUM: Primary | ICD-10-CM

## 2018-03-26 PROCEDURE — 99213 OFFICE O/P EST LOW 20 MIN: CPT | Mod: TH,S$PBB,, | Performed by: OBSTETRICS & GYNECOLOGY

## 2018-03-26 PROCEDURE — 99212 OFFICE O/P EST SF 10 MIN: CPT | Mod: PBBFAC,TH,PN | Performed by: OBSTETRICS & GYNECOLOGY

## 2018-03-26 PROCEDURE — 99999 PR PBB SHADOW E&M-EST. PATIENT-LVL II: CPT | Mod: PBBFAC,,, | Performed by: OBSTETRICS & GYNECOLOGY

## 2018-03-26 NOTE — PROGRESS NOTES
Good FM.  Denies CTX.  No bleeding.  Mild pelvic pressure.  Cx: unchanged.  We discussed C/S and her desire for BTL.  We reviewed BTL as a permanent, irreversible method of sterilization with 1/200 risk of failure, increased risk of ectopics, and PTS.  We reviewed LBTL as well as Essure.  We also discussed all other methods of contraception: barrier, hormonal, IUD, vas, etc.  She wants to proceed with BTL at the time of C/S.  BTL papers had been signed 12/18/2017.  Return 1 week.  [facility] bid.

## 2018-04-02 ENCOUNTER — ROUTINE PRENATAL (OUTPATIENT)
Dept: OBSTETRICS AND GYNECOLOGY | Facility: CLINIC | Age: 28
End: 2018-04-02
Attending: OBSTETRICS & GYNECOLOGY
Payer: MEDICAID

## 2018-04-02 VITALS
WEIGHT: 207.44 LBS | BODY MASS INDEX: 33.48 KG/M2 | DIASTOLIC BLOOD PRESSURE: 60 MMHG | SYSTOLIC BLOOD PRESSURE: 110 MMHG

## 2018-04-02 DIAGNOSIS — O34.219 PREVIOUS CESAREAN DELIVERY AFFECTING PREGNANCY, ANTEPARTUM: Primary | ICD-10-CM

## 2018-04-02 DIAGNOSIS — R82.90 ABNORMAL URINE: ICD-10-CM

## 2018-04-02 LAB
BACTERIA #/AREA URNS AUTO: NORMAL /HPF
BILIRUB UR QL STRIP: NEGATIVE
CLARITY UR REFRACT.AUTO: CLEAR
COLOR UR AUTO: YELLOW
GLUCOSE UR QL STRIP: NEGATIVE
HGB UR QL STRIP: ABNORMAL
KETONES UR QL STRIP: NEGATIVE
LEUKOCYTE ESTERASE UR QL STRIP: NEGATIVE
MICROSCOPIC COMMENT: NORMAL
NITRITE UR QL STRIP: NEGATIVE
PH UR STRIP: 6 [PH] (ref 5–8)
PROT UR QL STRIP: NEGATIVE
RBC #/AREA URNS AUTO: 2 /HPF (ref 0–4)
SP GR UR STRIP: 1.01 (ref 1–1.03)
SQUAMOUS #/AREA URNS AUTO: 3 /HPF
URN SPEC COLLECT METH UR: ABNORMAL
UROBILINOGEN UR STRIP-ACNC: NEGATIVE EU/DL
WBC #/AREA URNS AUTO: 2 /HPF (ref 0–5)

## 2018-04-02 PROCEDURE — 87086 URINE CULTURE/COLONY COUNT: CPT

## 2018-04-02 PROCEDURE — 81001 URINALYSIS AUTO W/SCOPE: CPT

## 2018-04-02 PROCEDURE — 99999 PR PBB SHADOW E&M-EST. PATIENT-LVL III: CPT | Mod: PBBFAC,,, | Performed by: OBSTETRICS & GYNECOLOGY

## 2018-04-02 PROCEDURE — 99213 OFFICE O/P EST LOW 20 MIN: CPT | Mod: PBBFAC,TH,PN | Performed by: OBSTETRICS & GYNECOLOGY

## 2018-04-02 PROCEDURE — 99213 OFFICE O/P EST LOW 20 MIN: CPT | Mod: TH,S$PBB,, | Performed by: OBSTETRICS & GYNECOLOGY

## 2018-04-02 NOTE — PROGRESS NOTES
Good FM.  Occasional cramping.  No bleeding.  Cx: unchanged.  Reports recent diarrhea - discussed.  Urine dip with trace nitrates and trace blood.  Will check urine culture.  She is on Macrobid q hs.  Scheduled for repeat C/S with BTL 4/6/18.  She is sure that she wants BTL at the radha of C/S.  Pre-op talk.  [facility] bid.

## 2018-04-03 ENCOUNTER — PATIENT MESSAGE (OUTPATIENT)
Dept: OBSTETRICS AND GYNECOLOGY | Facility: CLINIC | Age: 28
End: 2018-04-03

## 2018-04-03 ENCOUNTER — HOSPITAL ENCOUNTER (EMERGENCY)
Facility: OTHER | Age: 28
Discharge: HOME | End: 2018-04-03
Payer: MEDICAID

## 2018-04-03 VITALS
OXYGEN SATURATION: 100 % | HEART RATE: 88 BPM | TEMPERATURE: 98 F | DIASTOLIC BLOOD PRESSURE: 61 MMHG | SYSTOLIC BLOOD PRESSURE: 108 MMHG | RESPIRATION RATE: 18 BRPM

## 2018-04-03 DIAGNOSIS — O36.8131 DECREASED FETAL MOVEMENTS IN THIRD TRIMESTER, FETUS 1 OF MULTIPLE GESTATION: ICD-10-CM

## 2018-04-03 DIAGNOSIS — R11.0 NAUSEA: Primary | ICD-10-CM

## 2018-04-03 DIAGNOSIS — Z3A.38 38 WEEKS GESTATION OF PREGNANCY: ICD-10-CM

## 2018-04-03 LAB
ANION GAP SERPL CALC-SCNC: 13 MMOL/L
BACTERIA UR CULT: NORMAL
BILIRUB SERPL-MCNC: NORMAL MG/DL
BLOOD URINE, POC: NORMAL
BUN SERPL-MCNC: 8 MG/DL
CALCIUM SERPL-MCNC: 9 MG/DL
CHLORIDE SERPL-SCNC: 104 MMOL/L
CO2 SERPL-SCNC: 19 MMOL/L
COLOR, POC UA: NORMAL
CREAT SERPL-MCNC: 0.8 MG/DL
EST. GFR  (AFRICAN AMERICAN): >60 ML/MIN/1.73 M^2
EST. GFR  (NON AFRICAN AMERICAN): >60 ML/MIN/1.73 M^2
GLUCOSE SERPL-MCNC: 85 MG/DL
GLUCOSE UR QL STRIP: NORMAL
KETONES UR QL STRIP: NORMAL
LEUKOCYTE ESTERASE URINE, POC: NORMAL
NITRITE, POC UA: NORMAL
PH, POC UA: 5
POTASSIUM SERPL-SCNC: 3.9 MMOL/L
PROTEIN, POC: NORMAL
SODIUM SERPL-SCNC: 136 MMOL/L
SPECIFIC GRAVITY, POC UA: 1.02
UROBILINOGEN, POC UA: NORMAL

## 2018-04-03 PROCEDURE — 59025 FETAL NON-STRESS TEST: CPT

## 2018-04-03 PROCEDURE — 99284 EMERGENCY DEPT VISIT MOD MDM: CPT | Mod: 25,,, | Performed by: OBSTETRICS & GYNECOLOGY

## 2018-04-03 PROCEDURE — 96361 HYDRATE IV INFUSION ADD-ON: CPT | Mod: 59

## 2018-04-03 PROCEDURE — 99285 EMERGENCY DEPT VISIT HI MDM: CPT | Mod: 25

## 2018-04-03 PROCEDURE — 63600175 PHARM REV CODE 636 W HCPCS: Performed by: STUDENT IN AN ORGANIZED HEALTH CARE EDUCATION/TRAINING PROGRAM

## 2018-04-03 PROCEDURE — 96374 THER/PROPH/DIAG INJ IV PUSH: CPT

## 2018-04-03 PROCEDURE — 59025 FETAL NON-STRESS TEST: CPT | Mod: 26,,, | Performed by: OBSTETRICS & GYNECOLOGY

## 2018-04-03 PROCEDURE — 25000003 PHARM REV CODE 250: Performed by: STUDENT IN AN ORGANIZED HEALTH CARE EDUCATION/TRAINING PROGRAM

## 2018-04-03 PROCEDURE — 80048 BASIC METABOLIC PNL TOTAL CA: CPT

## 2018-04-03 RX ORDER — ONDANSETRON 2 MG/ML
4 INJECTION INTRAMUSCULAR; INTRAVENOUS ONCE
Status: COMPLETED | OUTPATIENT
Start: 2018-04-03 | End: 2018-04-03

## 2018-04-03 RX ADMIN — SODIUM CHLORIDE, POTASSIUM CHLORIDE, SODIUM LACTATE AND CALCIUM CHLORIDE 1000 ML: 600; 310; 30; 20 INJECTION, SOLUTION INTRAVENOUS at 09:04

## 2018-04-03 RX ADMIN — SODIUM CHLORIDE, POTASSIUM CHLORIDE, SODIUM LACTATE AND CALCIUM CHLORIDE 1000 ML: 600; 310; 30; 20 INJECTION, SOLUTION INTRAVENOUS at 10:04

## 2018-04-03 RX ADMIN — ONDANSETRON HYDROCHLORIDE 4 MG: 2 INJECTION, SOLUTION INTRAMUSCULAR; INTRAVENOUS at 09:04

## 2018-04-04 NOTE — ED NOTES
Pt given discharge instructions, verbally and written. Pt verbalized understanding. All questions answered. No further questions at this time. Pt discharged to home.

## 2018-04-04 NOTE — ED PROVIDER NOTES
"Encounter Date: 4/3/2018       History     Chief Complaint   Patient presents with    Emesis    Diarrhea    Decreased Fetal Movement     Shaye Bermudez is a 27 y.o. [id number] at 38w5d presents complaining of diarrhea for last 4 days and nausea/emesis since earlier today along with decreased fetal movement. Patient reports having emesis x2 today. +sick contacts (child and FOB). Denies fever, cough, nasal drip, HA.  Patient reports history of "kidney infection" in this pregnancy and is currently on daily macrobid. She had negative urine culture yesterday, denies dysuria.     This IUP is complicated by hx of c/s x2.  Patient reports intermittent contractions, denies vaginal bleeding, denies LOF.      Patient reports good fetal movement since being in CANDACE.             Review of patient's allergies indicates:   Allergen Reactions    Banana Anaphylaxis     Past Medical History:   Diagnosis Date    Abnormal Pap smear of cervix      Past Surgical History:   Procedure Laterality Date     SECTION      CHOLECYSTECTOMY       Family History   Problem Relation Age of Onset    Breast cancer Paternal Grandmother     Diabetes Mother     Hypertension Neg Hx     Colon cancer Neg Hx     Ovarian cancer Neg Hx      Social History   Substance Use Topics    Smoking status: Current Some Day Smoker    Smokeless tobacco: Never Used    Alcohol use No     Review of Systems   Constitutional: Positive for appetite change. Negative for activity change, chills, diaphoresis, fatigue and fever.   HENT: Negative.    Eyes: Negative.    Respiratory: Negative for apnea, cough, chest tightness and shortness of breath.    Cardiovascular: Negative for chest pain.   Gastrointestinal: Positive for diarrhea, nausea and vomiting. Negative for abdominal distention, abdominal pain and constipation.   Endocrine: Negative.    Genitourinary: Negative.    Musculoskeletal: Negative for back pain.   Skin: Negative for rash.   Neurological: " Negative for headaches.   Hematological: Does not bruise/bleed easily.   Psychiatric/Behavioral: The patient is not nervous/anxious.        Physical Exam     Initial Vitals   BP Pulse Resp Temp SpO2   04/03/18 2102 04/03/18 2100 04/03/18 2100 04/03/18 2103 04/03/18 2100   118/74 108 18 97.7 °F (36.5 °C) 99 %      MAP       04/03/18 2102       88.67         Physical Exam    Vitals reviewed.  Constitutional: She appears well-developed and well-nourished. She is not diaphoretic. No distress.   Cardiovascular: Normal rate, regular rhythm, normal heart sounds and intact distal pulses.   Pulmonary/Chest: Breath sounds normal. No respiratory distress.   Abdominal: Soft. There is no tenderness. There is no rebound and no guarding.   Genitourinary: Vagina normal.   Neurological: She is alert and oriented to person, place, and time. She has normal strength. No sensory deficit.   Psychiatric: She has a normal mood and affect. Her behavior is normal. Judgment and thought content normal.     OB LABOR EXAM:             Dilatation: 3.   Station: -3.   Effacement: 50%.             ED Course   Obtain Fetal nonstress test (NST)  Date/Time: 4/3/2018 11:07 PM  Performed by: KIT VAQSUEZ  Authorized by: CHRISTIANO MUNOZ     Nonstress Test:     Variability:  6-25 BPM    Decelerations:  None    Accelerations:  15 bpm    Baseline:  120    Uterine Irritability: Yes      Contractions:  Not present  Biophysical Profile:     Nonstress Test Interpretation: reactive      Overall Impression:  Reassuring      Labs Reviewed   BASIC METABOLIC PANEL - Abnormal; Notable for the following:        Result Value    CO2 19 (*)     All other components within normal limits   POCT URINALYSIS, DIPSTICK OR TABLET REAGENT, AUTOMATED, WITH MICROSCOP             Medical Decision Making:   ED Management:  - NST - reactive. Feeling baby move in CANDACE  - IV fluids - 2 L NaCl  - Concern for viral GI  - BMP, CBC wnl  - UA - ketones present  - Zofran with  "relief of nausea  - Cervical exam - non laboring cervix  - Patient counseled on labor precautions and to return, call md for:  Excessive vaginal bleeding beyond spotting over next 48 hours  Frequent, painful contractions  Fluid loss ("gush of fluid")  Decreased fetal movement  - Stable for discharge  Pt scheduled for repeat CS + BTL on Friday with Dr Thornton                  Attending Attestation:   Physician Attestation Statement for Resident:  As the supervising MD   Physician Attestation Statement: I have personally seen and examined this patient.   I agree with the above history. -:   As the supervising MD I agree with the above PE.    As the supervising MD I agree with the above treatment, course, plan, and disposition.  I was personally present during the critical portions of the procedure(s) performed by the resident and was immediately available in the ED to provide services and assistance as needed during the entire procedure.  I have reviewed and agree with the residents interpretation of the following: rhythm strips and lab data.  I have reviewed the following: old records at this facility.                       Clinical Impression:   The primary encounter diagnosis was Nausea. Diagnoses of Decreased fetal movements in third trimester, fetus 1 of multiple gestation and 38 weeks gestation of pregnancy were also pertinent to this visit.                           Shreyas Tian MD  Resident  04/03/18 2232       Dahiana Wisdom MD  04/03/18 2311       Dahiana Wisdom MD  04/03/18 2311       Dahiana Wisdom MD  04/03/18 2321    "

## 2018-04-04 NOTE — DISCHARGE INSTRUCTIONS
Dehydration    Call clinic [phone] or L & D after hours at [phone] for vaginal bleeding, leakage of fluids, regular contractions every 5 mins for 2 hours, decreased fetal movements ( 10 kicks in 2 hours), headache not relieved by Tylenol, blurry vision, or temp of 100.4 or greater.  Begin doing fetal kick counts, at least 10 movements in 2 hours starting at 28 weeks gestation.  Keep next clinic appointment  The human body is comprised largely of water. If you lose more fluids than you take in, you can become dehydrated. This means there are not enough fluids in your body for it to function right. Mild dehydration can cause weakness, confusion, or muscle cramps. In extreme cases, it can lead to brain damage and even death. That's why prompt treatment is crucial.  Risk factors  Anyone can become dehydrated. But infants, children, and older adults are at greatest risk. You are most likely to lose fluids with severe vomiting, diarrhea, or a fever. Exercising or working hard--especially in hot weather--can also cause excess fluid loss.  What to do  Drinking liquids is the best way to prevent dehydration. Water is best, but juice or frozen pops can also help. Your doctor may suggest electrolyte solutions for sick infants and young children.  When to go to the emergency room (ER)  Go to an ER right away for these symptoms:  Adults  · Very dark urine and little urine output  · Dizziness, weakness, confusion, fainting  Children  · Sunken eyes  · Little or no urine output (for infants, no wet diaper in 8 hours)  · Very dark urine  · Skin that doesn't bounce back quickly when pinched  · Crying without tears  What to expect in the ER  Your blood pressure, temperature, and heart rate will be checked. You may have blood or urine tests. The main treatment for dehydration is fluids. You may be given these to drink. Or, you may receive them through a vein in your arm. You also may be treated for diarrhea, vomiting, or a high  fever.   Date Last Reviewed: 7/18/2015  © 2667-7005 [employer]. [address] [place] [address], [place] [address]. All rights reserved. This information is not intended as a substitute for professional medical care. Always follow your healthcare professional's instructions.

## 2018-04-06 ENCOUNTER — HOSPITAL ENCOUNTER (INPATIENT)
Facility: OTHER | Age: 28
LOS: 2 days | Discharge: HOME | End: 2018-04-08
Attending: OBSTETRICS & GYNECOLOGY | Admitting: OBSTETRICS & GYNECOLOGY
Payer: MEDICAID

## 2018-04-06 ENCOUNTER — ANESTHESIA EVENT (OUTPATIENT)
Dept: OBSTETRICS AND GYNECOLOGY | Facility: OTHER | Age: 28
End: 2018-04-06
Payer: MEDICAID

## 2018-04-06 ENCOUNTER — SURGERY (OUTPATIENT)
Age: 28
End: 2018-04-06

## 2018-04-06 ENCOUNTER — ANESTHESIA (OUTPATIENT)
Dept: OBSTETRICS AND GYNECOLOGY | Facility: OTHER | Age: 28
End: 2018-04-06
Payer: MEDICAID

## 2018-04-06 DIAGNOSIS — Z98.891 H/O CESAREAN SECTION: ICD-10-CM

## 2018-04-06 DIAGNOSIS — O34.219 PREVIOUS CESAREAN DELIVERY AFFECTING PREGNANCY, ANTEPARTUM: Primary | ICD-10-CM

## 2018-04-06 LAB
ABO + RH BLD: NORMAL
ALLENS TEST: ABNORMAL
ALLENS TEST: ABNORMAL
BASOPHILS # BLD AUTO: 0.02 K/UL
BASOPHILS NFR BLD: 0.1 %
BLD GP AB SCN CELLS X3 SERPL QL: NORMAL
DIFFERENTIAL METHOD: ABNORMAL
EOSINOPHIL # BLD AUTO: 0.1 K/UL
EOSINOPHIL NFR BLD: 0.9 %
ERYTHROCYTE [DISTWIDTH] IN BLOOD BY AUTOMATED COUNT: 14.2 %
HBV SURFACE AG SERPL QL IA: NEGATIVE
HCO3 UR-SCNC: 23.2 MMOL/L (ref 24–28)
HCO3 UR-SCNC: 23.9 MMOL/L (ref 24–28)
HCT VFR BLD AUTO: 38 %
HGB BLD-MCNC: 12.9 G/DL
LYMPHOCYTES # BLD AUTO: 3.5 K/UL
LYMPHOCYTES NFR BLD: 26.1 %
MCH RBC QN AUTO: 30 PG
MCHC RBC AUTO-ENTMCNC: 33.9 G/DL
MCV RBC AUTO: 88 FL
MONOCYTES # BLD AUTO: 0.7 K/UL
MONOCYTES NFR BLD: 5.4 %
NEUTROPHILS # BLD AUTO: 9 K/UL
NEUTROPHILS NFR BLD: 67.1 %
PCO2 BLDA: 58.2 MMHG (ref 35–45)
PCO2 BLDA: 64.4 MMHG (ref 35–45)
PH SMN: 7.18 [PH] (ref 7.35–7.45)
PH SMN: 7.21 [PH] (ref 7.35–7.45)
PLATELET # BLD AUTO: 318 K/UL
PMV BLD AUTO: 10.4 FL
PO2 BLDA: 13 MMHG (ref 80–100)
PO2 BLDA: <5 MMHG (ref 80–100)
POC BE: -5 MMOL/L
POC BE: -5 MMOL/L
POC SATURATED O2: 10 % (ref 95–100)
POC SATURATED O2: ABNORMAL %
RBC # BLD AUTO: 4.3 M/UL
SAMPLE: ABNORMAL
SAMPLE: ABNORMAL
SITE: ABNORMAL
SITE: ABNORMAL
WBC # BLD AUTO: 13.38 K/UL

## 2018-04-06 PROCEDURE — 63600175 PHARM REV CODE 636 W HCPCS: Performed by: STUDENT IN AN ORGANIZED HEALTH CARE EDUCATION/TRAINING PROGRAM

## 2018-04-06 PROCEDURE — 88302 TISSUE EXAM BY PATHOLOGIST: CPT | Performed by: PATHOLOGY

## 2018-04-06 PROCEDURE — 85025 COMPLETE CBC W/AUTO DIFF WBC: CPT

## 2018-04-06 PROCEDURE — 36000685 HC CESAREAN SECTION LEVEL I

## 2018-04-06 PROCEDURE — 59514 CESAREAN DELIVERY ONLY: CPT | Mod: 80,GB,, | Performed by: OBSTETRICS & GYNECOLOGY

## 2018-04-06 PROCEDURE — S0028 INJECTION, FAMOTIDINE, 20 MG: HCPCS | Performed by: STUDENT IN AN ORGANIZED HEALTH CARE EDUCATION/TRAINING PROGRAM

## 2018-04-06 PROCEDURE — 59514 CESAREAN DELIVERY ONLY: CPT | Mod: ,,, | Performed by: ANESTHESIOLOGY

## 2018-04-06 PROCEDURE — 87340 HEPATITIS B SURFACE AG IA: CPT

## 2018-04-06 PROCEDURE — 86762 RUBELLA ANTIBODY: CPT

## 2018-04-06 PROCEDURE — 59514 CESAREAN DELIVERY ONLY: CPT | Mod: GB,,, | Performed by: OBSTETRICS & GYNECOLOGY

## 2018-04-06 PROCEDURE — 51702 INSERT TEMP BLADDER CATH: CPT

## 2018-04-06 PROCEDURE — 58611 LIGATE OVIDUCT(S) ADD-ON: CPT | Mod: 80,,, | Performed by: OBSTETRICS & GYNECOLOGY

## 2018-04-06 PROCEDURE — 58611 LIGATE OVIDUCT(S) ADD-ON: CPT | Mod: ,,, | Performed by: OBSTETRICS & GYNECOLOGY

## 2018-04-06 PROCEDURE — 82803 BLOOD GASES ANY COMBINATION: CPT

## 2018-04-06 PROCEDURE — 11000001 HC ACUTE MED/SURG PRIVATE ROOM

## 2018-04-06 PROCEDURE — 37000009 HC ANESTHESIA EA ADD 15 MINS: Performed by: OBSTETRICS & GYNECOLOGY

## 2018-04-06 PROCEDURE — 86850 RBC ANTIBODY SCREEN: CPT

## 2018-04-06 PROCEDURE — 99900035 HC TECH TIME PER 15 MIN (STAT)

## 2018-04-06 PROCEDURE — 0UB70ZZ EXCISION OF BILATERAL FALLOPIAN TUBES, OPEN APPROACH: ICD-10-PCS | Performed by: OBSTETRICS & GYNECOLOGY

## 2018-04-06 PROCEDURE — 25000003 PHARM REV CODE 250: Performed by: STUDENT IN AN ORGANIZED HEALTH CARE EDUCATION/TRAINING PROGRAM

## 2018-04-06 PROCEDURE — 88302 TISSUE EXAM BY PATHOLOGIST: CPT | Mod: 26,,, | Performed by: PATHOLOGY

## 2018-04-06 PROCEDURE — S0020 INJECTION, BUPIVICAINE HYDRO: HCPCS | Performed by: STUDENT IN AN ORGANIZED HEALTH CARE EDUCATION/TRAINING PROGRAM

## 2018-04-06 PROCEDURE — 27800516 HC TRAY, EPIDURAL COMBO: Performed by: STUDENT IN AN ORGANIZED HEALTH CARE EDUCATION/TRAINING PROGRAM

## 2018-04-06 PROCEDURE — 37000008 HC ANESTHESIA 1ST 15 MINUTES: Performed by: OBSTETRICS & GYNECOLOGY

## 2018-04-06 RX ORDER — PHENYLEPHRINE HYDROCHLORIDE 10 MG/ML
INJECTION INTRAVENOUS
Status: DISCONTINUED | OUTPATIENT
Start: 2018-04-06 | End: 2018-04-06

## 2018-04-06 RX ORDER — ONDANSETRON HYDROCHLORIDE 2 MG/ML
INJECTION, SOLUTION INTRAMUSCULAR; INTRAVENOUS
Status: DISCONTINUED | OUTPATIENT
Start: 2018-04-06 | End: 2018-04-06

## 2018-04-06 RX ORDER — MUPIROCIN 20 MG/G
OINTMENT TOPICAL
Status: DISCONTINUED | OUTPATIENT
Start: 2018-04-06 | End: 2018-04-06

## 2018-04-06 RX ORDER — CEFAZOLIN SODIUM 1 G/3ML
2 INJECTION, POWDER, FOR SOLUTION INTRAMUSCULAR; INTRAVENOUS
Status: DISCONTINUED | OUTPATIENT
Start: 2018-04-06 | End: 2018-04-08 | Stop reason: HOSPADM

## 2018-04-06 RX ORDER — BUPIVACAINE HYDROCHLORIDE 7.5 MG/ML
INJECTION, SOLUTION EPIDURAL; RETROBULBAR
Status: DISCONTINUED | OUTPATIENT
Start: 2018-04-06 | End: 2018-04-06

## 2018-04-06 RX ORDER — OXYTOCIN 10 [USP'U]/ML
INJECTION, SOLUTION INTRAMUSCULAR; INTRAVENOUS
Status: DISCONTINUED | OUTPATIENT
Start: 2018-04-06 | End: 2018-04-06

## 2018-04-06 RX ORDER — MORPHINE SULFATE 0.5 MG/ML
INJECTION, SOLUTION EPIDURAL; INTRATHECAL; INTRAVENOUS
Status: DISCONTINUED | OUTPATIENT
Start: 2018-04-06 | End: 2018-04-06

## 2018-04-06 RX ORDER — OXYCODONE HYDROCHLORIDE 5 MG/1
5 TABLET ORAL EVERY 4 HOURS PRN
Status: DISCONTINUED | OUTPATIENT
Start: 2018-04-06 | End: 2018-04-07 | Stop reason: SDUPTHER

## 2018-04-06 RX ORDER — ACETAMINOPHEN 10 MG/ML
INJECTION, SOLUTION INTRAVENOUS
Status: DISCONTINUED | OUTPATIENT
Start: 2018-04-06 | End: 2018-04-06

## 2018-04-06 RX ORDER — FENTANYL CITRATE 50 UG/ML
INJECTION, SOLUTION INTRAMUSCULAR; INTRAVENOUS
Status: DISCONTINUED | OUTPATIENT
Start: 2018-04-06 | End: 2018-04-06

## 2018-04-06 RX ORDER — FAMOTIDINE 10 MG/ML
20 INJECTION INTRAVENOUS
Status: DISCONTINUED | OUTPATIENT
Start: 2018-04-06 | End: 2018-04-08 | Stop reason: HOSPADM

## 2018-04-06 RX ORDER — SODIUM CITRATE AND CITRIC ACID MONOHYDRATE 334; 500 MG/5ML; MG/5ML
30 SOLUTION ORAL
Status: DISCONTINUED | OUTPATIENT
Start: 2018-04-06 | End: 2018-04-08 | Stop reason: HOSPADM

## 2018-04-06 RX ORDER — MISOPROSTOL 200 UG/1
800 TABLET ORAL
Status: DISCONTINUED | OUTPATIENT
Start: 2018-04-06 | End: 2018-04-08 | Stop reason: HOSPADM

## 2018-04-06 RX ORDER — SODIUM CHLORIDE, SODIUM LACTATE, POTASSIUM CHLORIDE, CALCIUM CHLORIDE 600; 310; 30; 20 MG/100ML; MG/100ML; MG/100ML; MG/100ML
INJECTION, SOLUTION INTRAVENOUS CONTINUOUS PRN
Status: DISCONTINUED | OUTPATIENT
Start: 2018-04-06 | End: 2018-04-06

## 2018-04-06 RX ORDER — OXYCODONE HYDROCHLORIDE 5 MG/1
10 TABLET ORAL EVERY 4 HOURS PRN
Status: DISCONTINUED | OUTPATIENT
Start: 2018-04-06 | End: 2018-04-07 | Stop reason: SDUPTHER

## 2018-04-06 RX ORDER — KETOROLAC TROMETHAMINE 30 MG/ML
30 INJECTION, SOLUTION INTRAMUSCULAR; INTRAVENOUS EVERY 6 HOURS
Status: COMPLETED | OUTPATIENT
Start: 2018-04-06 | End: 2018-04-07

## 2018-04-06 RX ORDER — KETOROLAC TROMETHAMINE 30 MG/ML
INJECTION, SOLUTION INTRAMUSCULAR; INTRAVENOUS
Status: DISCONTINUED | OUTPATIENT
Start: 2018-04-06 | End: 2018-04-06

## 2018-04-06 RX ORDER — ONDANSETRON 2 MG/ML
4 INJECTION INTRAMUSCULAR; INTRAVENOUS EVERY 6 HOURS PRN
Status: DISCONTINUED | OUTPATIENT
Start: 2018-04-06 | End: 2018-04-08 | Stop reason: HOSPADM

## 2018-04-06 RX ORDER — SODIUM CHLORIDE, SODIUM LACTATE, POTASSIUM CHLORIDE, CALCIUM CHLORIDE 600; 310; 30; 20 MG/100ML; MG/100ML; MG/100ML; MG/100ML
INJECTION, SOLUTION INTRAVENOUS CONTINUOUS
Status: DISCONTINUED | OUTPATIENT
Start: 2018-04-06 | End: 2018-04-08 | Stop reason: HOSPADM

## 2018-04-06 RX ORDER — ACETAMINOPHEN 325 MG/1
650 TABLET ORAL EVERY 6 HOURS
Status: COMPLETED | OUTPATIENT
Start: 2018-04-06 | End: 2018-04-07

## 2018-04-06 RX ORDER — NALBUPHINE HYDROCHLORIDE 10 MG/ML
10 INJECTION, SOLUTION INTRAMUSCULAR; INTRAVENOUS; SUBCUTANEOUS
Status: DISCONTINUED | OUTPATIENT
Start: 2018-04-06 | End: 2018-04-08 | Stop reason: HOSPADM

## 2018-04-06 RX ADMIN — Medication 0.15 MG: at 07:04

## 2018-04-06 RX ADMIN — FAMOTIDINE 20 MG: 10 INJECTION, SOLUTION INTRAVENOUS at 06:04

## 2018-04-06 RX ADMIN — PHENYLEPHRINE HYDROCHLORIDE 100 MCG: 10 INJECTION INTRAVENOUS at 07:04

## 2018-04-06 RX ADMIN — SODIUM CHLORIDE, SODIUM LACTATE, POTASSIUM CHLORIDE, AND CALCIUM CHLORIDE: 600; 310; 30; 20 INJECTION, SOLUTION INTRAVENOUS at 06:04

## 2018-04-06 RX ADMIN — OXYTOCIN 1 UNITS: 10 INJECTION, SOLUTION INTRAMUSCULAR; INTRAVENOUS at 07:04

## 2018-04-06 RX ADMIN — OXYTOCIN 1 UNITS: 10 INJECTION, SOLUTION INTRAMUSCULAR; INTRAVENOUS at 08:04

## 2018-04-06 RX ADMIN — NALBUPHINE HYDROCHLORIDE 10 MG: 10 INJECTION, SOLUTION INTRAMUSCULAR; INTRAVENOUS; SUBCUTANEOUS at 09:04

## 2018-04-06 RX ADMIN — KETOROLAC TROMETHAMINE 30 MG: 30 INJECTION, SOLUTION INTRAMUSCULAR; INTRAVENOUS at 07:04

## 2018-04-06 RX ADMIN — ACETAMINOPHEN 650 MG: 325 TABLET ORAL at 02:04

## 2018-04-06 RX ADMIN — ONDANSETRON 4 MG: 2 INJECTION, SOLUTION INTRAMUSCULAR; INTRAVENOUS at 07:04

## 2018-04-06 RX ADMIN — DEXTROSE 2 G: 50 INJECTION, SOLUTION INTRAVENOUS at 07:04

## 2018-04-06 RX ADMIN — ACETAMINOPHEN 1000 MG: 10 INJECTION, SOLUTION INTRAVENOUS at 07:04

## 2018-04-06 RX ADMIN — OXYCODONE HYDROCHLORIDE 5 MG: 5 TABLET ORAL at 11:04

## 2018-04-06 RX ADMIN — ACETAMINOPHEN 650 MG: 325 TABLET ORAL at 08:04

## 2018-04-06 RX ADMIN — OXYTOCIN 3 UNITS: 10 INJECTION, SOLUTION INTRAMUSCULAR; INTRAVENOUS at 07:04

## 2018-04-06 RX ADMIN — KETOROLAC TROMETHAMINE 30 MG: 30 INJECTION, SOLUTION INTRAMUSCULAR at 02:04

## 2018-04-06 RX ADMIN — SODIUM CITRATE AND CITRIC ACID MONOHYDRATE 30 ML: 500; 334 SOLUTION ORAL at 06:04

## 2018-04-06 RX ADMIN — SODIUM CHLORIDE, SODIUM LACTATE, POTASSIUM CHLORIDE, AND CALCIUM CHLORIDE 1000 ML: .6; .31; .03; .02 INJECTION, SOLUTION INTRAVENOUS at 05:04

## 2018-04-06 RX ADMIN — PHENYLEPHRINE HYDROCHLORIDE 200 MCG: 10 INJECTION INTRAVENOUS at 07:04

## 2018-04-06 RX ADMIN — KETOROLAC TROMETHAMINE 30 MG: 30 INJECTION, SOLUTION INTRAMUSCULAR at 08:04

## 2018-04-06 RX ADMIN — BUPIVACAINE HYDROCHLORIDE 1.6 MG: 7.5 INJECTION, SOLUTION EPIDURAL; RETROBULBAR at 07:04

## 2018-04-06 RX ADMIN — SODIUM CHLORIDE, SODIUM LACTATE, POTASSIUM CHLORIDE, AND CALCIUM CHLORIDE 500 ML: .6; .31; .03; .02 INJECTION, SOLUTION INTRAVENOUS at 10:04

## 2018-04-06 RX ADMIN — FENTANYL CITRATE 10 MCG: 50 INJECTION, SOLUTION INTRAMUSCULAR; INTRAVENOUS at 07:04

## 2018-04-06 NOTE — DISCHARGE INSTRUCTIONS
Breastfeeding Discharge Instructions       Feed the baby at the earliest sign of hunger or comfort  o Hands to mouth, sucking motions  o Rooting or searching for something to suck on  o Dont wait for crying - it is a sign of distress     The feedings may be 8-12 times per 24hrs and will not follow a schedule   Avoid pacifiers and bottles for the first 4 weeks   Alternate the breast you start the feeding with, or start with the breast that feels the fullest   Switch breasts when the baby takes himself off the breast or falls asleep   Keep offering breasts until the baby looks full, no longer gives hunger signs, and stays asleep when placed on his back in the crib   If the baby is sleepy and wont wake for a feeding, put the baby skin-to-skin dressed in a diaper against the mothers bare chest   Sleep near your baby   The baby should be positioned and latched on to the breast correctly  o Chest-to-chest, chin in the breast  o Babys lips are flipped outward  o Babys mouth is stretched open wide like a shout  o Babys sucking should feel like tugging to the mother  - The baby should be drinking at the breast:  o You should hear swallowing or gulping throughout the feeding  o You should see milk on the babys lips when he comes off the breast  o Your breasts should be softer when the baby is finished feeding  o The baby should look relaxed at the end of feedings  o After the 4th day and your milk is in:  o The babys poop should turn bright yellow and be loose, watery, and seedy  o The baby should have at least 3-4 poops the size of the palm of your hand per day  o The baby should have at least 5-6 wet diapers per day  o The urine should be light yellow in color  You should drink when you are thirsty and eat a healthy diet when you are    hungry.     Take naps to get the rest you need.   Take medications and/or drink alcohol only with permission of your obstetrician    or the babys pediatrician.  You can  also call the [facility],   ([phone]), Monday-Friday, 8am-5pm Central time, to get the most   up-to-date evidence-based information on the use of medications during   pregnancy and breastfeeding.      The baby should be examined by a pediatrician at 3-5 days of age.  Once your   milk comes in, the baby should be gaining at least ½ - 1oz each day and should be back to birthweight no later than 10-14 days of age.          Community Resources    [facility] Breastfeeding Warmline: [phone]   Local [facility] clinics: provide incentives and breastpumps to eligible mothers  [employer] ([employer]):  mother-to-mother support group website        www.[employer] - innovate | mentor | invest  Local La Leche League mother-to-mother support groups:        www.[employer]        La Leche League [facility]   Dr. Bolivar Cisneros website for latch videos and general information:        www.breastfeedinginc.ca  [facility] is a call center that provides information about the safety of taking medications while breastfeeding.  Call [phone], M-F, 8am-5pm, CT.  International Lactation Consultant Association provides resources for assistance:        www.ilca.org  [facility]feeding [facility] provides informationand resources for parents  and the community    http://[facility] Departmentastfeeding.org     Katherine Heaton is a mom-to-mom support group:                             www.[email or web address].com//breastfeedng-support/  [employer] for Healthy Babies:  3-644-262-BABY(8590)  [employer]: a breastfeeding support group for women of color, [phone]

## 2018-04-06 NOTE — L&D DELIVERY NOTE
[facility]    SUMMARY    Date of Procedure: 2018       Procedure: Procedure(s) (LRB):  DELIVERY- SECTION WITH TUBAL (N/A)      Surgeon(s) and Role:     * Andrés Thornton MD - Primary     * Abelardo Beasley MD - Assisting      Pre-Operative Diagnosis: Previous  delivery affecting pregnancy, antepartum [O34.219]      Post-Operative Diagnosis: Post-Op Diagnosis Codes:     * Previous  delivery affecting pregnancy, antepartum [O34.219]      Anesthesia: Spinal/Epidural    Dictated    Estimated Blood Loss (EBL): 500 mL            Specimens: Specimen (12h ago through future)    Start     Ordered    18 0839  Specimen to Pathology - Surgery  Once,   Status:  Canceled     18 0838    18 0839  Specimen to Pathology - Surgery  Once   Comments:  Left and right fallopian tubes       Delivery Information for  Margaux Bermudez    Birth information:  YOB: 2020   Time of birth: 7:34 AM   Sex: female   Head Delivery Date/Time: 2018  7:33 AM   Delivery type: , Low Transverse   Gestational Age: 39w1d    Delivery Providers    Delivering clinician:  Andrés Thornton MD   Provider Role    Abelardo Beasley MD Assisting Surgeon    Dior kSy RN Registered Nurse    Maliha Richmond RN Registered Nurse    [facility]             Measurements    Weight:  3230 g  Length:  50.8 cm  Head circumference:  34.3 cm  Chest circumference:  33 cm          Assessment    Living status:  Living  Apgars:     1 Minute:   5 Minute:   10 Minute:   15 Minute:   20 Minute:     Skin Color:   1  1       Heart Rate:   2  2       Reflex Irritability:   2  2       Muscle Tone:   2  2       Respiratory Effort:   2  2       Total:   9  9               Apgars Assigned By:  DANDRE FRANCO         Assisted Delivery Details:    Forceps attempted?:  No  Vacuum extractor attempted?:  No         Shoulder Dystocia    Shoulder dystocia  present?:  No           Presentation and Position    Presentation:  Vertex           Interventions/Resuscitation    Method:  Bulb Suctioning, Tactile Stimulation       Cord    Vessels:  3 vessels  Complications:  Nuchal  Nuchal Intervention:  reduced  Nuchal Cord Description:  loose nuchal cord  Number of Loops:  2  Delayed Cord Clamping?:  No  Cord Clamped Date/Time:  2018  7:34 AM  Cord Blood Disposition:  Sent with Baby  Gases Sent?:  Yes  Stem Cell Collection (by MD):  No       Placenta    Date and time:  2018  7:35 AM  Removal:  Manual removal  Appearance:  Intact  Placenta disposition:  discarded           Labor Events:       labor:       Labor Onset Date/Time:         Dilation Complete Date/Time:         Start Pushing Date/Time:       Rupture Date/Time:              Rupture type:           Fluid Amount:        Fluid Color:        Fluid Odor:        Membrane Status (PeriCalm):        Rupture Date/Time (PeriCalm):        Fluid Amount (PeriCalm):        Fluid Color (PeriCalm):         steroids:       Antibiotics given for GBS:       Induction:       Indications for induction:        Augmentation:       Indications for augmentation:       Labor complications: None     Additional complications:          Cervical ripening:                     Delivery:      Episiotomy: None     Indication for Episiotomy:       Perineal Lacerations:   Repaired:      Periurethral Laceration:   Repaired:     Labial Laceration:   Repaired:     Sulcus Laceration:   Repaired:     Vaginal Laceration:   Repaired:     Cervical Laceration:   Repaired:     Repair suture:       Repair # of packets: 13     Vaginal delivery QBL (mL): 0      QBL (mL): 500     Combined Blood Loss (mL): 500     Vaginal Sweep Performed:       Surgicount Correct: Yes       Other providers:       Anesthesia    Method:  Spinal, Epidural          Details (if applicable):  Trial of Labor No    Categorization: Repeat     Priority: Routine   Indications for : Repeat Section   Incision Type: low transverse     Additional  information:  Forceps:    Vacuum:    Breech:    Observed anomalies    Other (Comments):

## 2018-04-06 NOTE — HPI
Shaye Bermudez is a 27 y.o.  female with IUP at 39w1d weeks gestation who is admitted for scheduled  Section with BTL secondary to prior c/s delivery x2.     This IUP is complicated by tobacco use, pyelonephritis at 16 wga, and prior c/s x2.  Patient denies contractions, denies vaginal bleeding, denies LOF.   Fetal Movement: normal.

## 2018-04-06 NOTE — PLAN OF CARE
Problem: Patient Care Overview  Goal: Plan of Care Review  Outcome: Ongoing (interventions implemented as appropriate)  LACTATION NOTE:  Mother will nurse baby on cue till content; will ensure baby achieves deep latch onto breast tissue with mouth open wide;  Will observe for signs of milk transfer; will monitor baby's 24hr diaper counts; will call for assistance prn.

## 2018-04-06 NOTE — ANESTHESIA PREPROCEDURE EVALUATION
Shaye Bermudez is a 27 y.o.  female with IUP at 39w1d weeks gestation who presents for a scheduled  section w/ BTL. She has had two prior  sections. The first  section was due to failure to progress. She takes macrobid at home 2/2 having had pylo for ppx. She reports no other significant past medical history. Chart review indicates she is an active tobacco user. She denies a history of asthma, bleeding, or clotting disorders.     OB History    Para Term  AB Living   7 2 2   3 2   SAB TAB Ectopic Multiple Live Births   3     0 2      # Outcome Date GA Lbr Colby/2nd Weight Sex Delivery Anes PTL Lv   7 Current            6 Term 10/24/14 39w1d  3.35 kg (7 lb 6.2 oz) F CS-LTranv Spinal N KAILEE   5 SAB 2013 6w0d          4 SAB 2008 8w0d             Birth Comments: System Generated. Please review and update pregnancy details.   3 SAB 2008 7w0d             Birth Comments: System Generated. Please review and update pregnancy details.   2 Term 07 38w0d 36:00 3.515 kg (7 lb 12 oz) M CS-LTranv EPI N KAILEE      Birth Comments: Arrest of dilation at 8 cm,  pyelo at 36 weeks   1                    Wt Readings from Last 1 Encounters:   18 0553 93.9 kg (207 lb)       BP Readings from Last 3 Encounters:   18 118/66   18 108/61   18 110/60       Patient Active Problem List   Diagnosis    Previous  delivery affecting pregnancy, antepartum    Left shoulder pain    APS labs 2014 - negative     Supervision of normal pregnancy    Missed ab    Status post D&C    Pyelonephritis affecting pregnancy    H/O  section       Past Surgical History:   Procedure Laterality Date     SECTION      CHOLECYSTECTOMY         Social History     Social History    Marital status:      Spouse name: N/A    Number of children: N/A    Years of education: N/A     Occupational History     [employer]     Social  History Main Topics    Smoking status: Current Some Day Smoker    Smokeless tobacco: Never Used    Alcohol use No    Drug use: No    Sexual activity: Yes     Partners: Male     Other Topics Concern    Not on file     Social History Narrative    No narrative on file         Chemistry        Component Value Date/Time     04/03/2018 2120    K 3.9 04/03/2018 2120     04/03/2018 2120    CO2 19 (L) 04/03/2018 2120    BUN 8 04/03/2018 2120    CREATININE 0.8 04/03/2018 2120    GLU 85 04/03/2018 2120        Component Value Date/Time    CALCIUM 9.0 04/03/2018 2120    ALKPHOS 121 10/30/2017 1640    AST 12 10/30/2017 1640    ALT 19 10/30/2017 1640    BILITOT 0.3 10/30/2017 1640    ESTGFRAFRICA >60 04/03/2018 2120    EGFRNONAA >60 04/03/2018 2120            Lab Results   Component Value Date    WBC 13.38 (H) 04/06/2018    HGB 12.9 04/06/2018    HCT 38.0 04/06/2018    MCV 88 04/06/2018     04/06/2018       No results for input(s): PT, INR, PROTIME, APTT in the last 72 hours.                  Anesthesia Evaluation    I have reviewed the Patient Summary Reports.    I have reviewed the Nursing Notes.   I have reviewed the Medications.     Review of Systems  Anesthesia Hx:  Denies Family Hx of Anesthesia complications.   Denies Personal Hx of Anesthesia complications.   Hematology/Oncology:  Hematology Normal   Oncology Normal     EENT/Dental:EENT/Dental Normal   Cardiovascular:   Exercise tolerance: good    Pulmonary:  Pulmonary Normal    Renal/:  Renal/ Normal     Hepatic/GI:  Hepatic/GI Normal    Musculoskeletal:  Musculoskeletal Normal    Neurological:  Neurology Normal    Endocrine:  Endocrine Normal    Dermatological:  Skin Normal    Psych:  Psychiatric Normal           Physical Exam  General:  Well nourished    Airway/Jaw/Neck:  Airway Findings: Mouth Opening: Normal Tongue: Normal  General Airway Assessment: Adult  Mallampati: II  TM Distance: Normal, at least 6 cm  Jaw/Neck Findings:  Neck ROM:  Normal ROM  Neck Findings: Normal    Eyes/Ears/Nose:  EYES/EARS/NOSE FINDINGS: Normal   Dental:  Dental Findings: In tact, Periodontal disease, Mild   Chest/Lungs:  Chest/Lungs Findings: Clear to auscultation, Normal Respiratory Rate     Heart/Vascular:  Heart Findings: Rate: Normal  Rhythm: Regular Rhythm  Sounds: Normal  Heart murmur: negative Vascular Findings: Normal    Abdomen:  Abdomen Findings: Normal    Musculoskeletal:  Musculoskeletal Findings: Normal   Skin:  Skin Findings: Normal    Mental Status:  Mental Status Findings:  Cooperative, Alert and Oriented         Anesthesia Plan  Type of Anesthesia, risks & benefits discussed:  Anesthesia Type:  general, epidural, CSE, spinal  Patient's Preference:   Intra-op Monitoring Plan: standard ASA monitors  Intra-op Monitoring Plan Comments:   Post Op Pain Control Plan: multimodal analgesia and per primary service following discharge from PACU  Post Op Pain Control Plan Comments:   Induction:    Beta Blocker:  Patient is not currently on a Beta-Blocker (No further documentation required).       Informed Consent: Patient understands risks and agrees with Anesthesia plan.  Questions answered. Anesthesia consent signed with patient.  ASA Score: 2     Day of Surgery Review of History & Physical:    H&P update referred to the surgeon.     Anesthesia Plan Notes: CSE        Ready For Surgery From Anesthesia Perspective.

## 2018-04-06 NOTE — ANESTHESIA PROCEDURE NOTES
CSE    Patient location during procedure: OR  Start time: 4/6/2018 7:11 AM  Timeout: 4/6/2018 7:10 AM  End time: 4/6/2018 7:20 AM  Staffing  Anesthesiologist: MERI MADISON  Resident/CRNA: MIGEUL NAILS  Performed: resident/CRNA   Preanesthetic Checklist  Completed: patient identified, site marked, surgical consent, pre-op evaluation, timeout performed, IV checked, risks and benefits discussed and monitors and equipment checked  CSE  Patient position: sitting  Prep: ChloraPrep  Patient monitoring: continuous pulse ox and frequent blood pressure checks  Approach: midline  Spinal Needle  Needle type: pencil-tip   Needle gauge: 25 G  Needle length: 3.5 in  Epidural Needle  Injection technique: SUNNY saline  Needle type: Tuohy   Needle gauge: 17 G  Needle length: 3.5 in  Needle insertion depth: 6 cm  Location: L4-5  Needle localization: anatomical landmarks  Catheter  Catheter type: Pathable  Catheter size: 18 G  Catheter at skin depth: 10 cm  Test dose: lidocaine 1.5% with Epi 1-to-200,000  Additional Documentation: incremental injection, negative aspiration for heme and no paresthesia on injection  Assessment  Sensory level: T4   Dermatomal levels determined by pinch or prick  Intrathecal Medications:  Bolus administered: 1.6 mL of 0.75 and with dextrose bupivacaine  administered: primary anesthetic and 160 mcg of  fentanyl and morphine (10 mcg of fentanyl and 150 mcg of morphine)

## 2018-04-06 NOTE — BRIEF OP NOTE
I was present for, scrubbed into and assisted on this procedure due to resident unavailability 2/2 educational time off.

## 2018-04-06 NOTE — SUBJECTIVE & OBJECTIVE
Obstetric History       T2      L2     SAB3   TAB0   Ectopic0   Multiple0   Live Births2       # Outcome Date GA Lbr Colby/2nd Weight Sex Delivery Anes PTL Lv   7 Current            6 Term 10/24/14 39w1d  3.35 kg (7 lb 6.2 oz) F CS-LTranv Spinal N KAILEE      Apgar1:  9                Apgar5: 9   5 SAB 2013 6w0d          4 SAB 2008 8w0d          3 2008 7w0d          2 Term 07 38w0d 36:00 3.515 kg (7 lb 12 oz) M CS-LTranv EPI N KAILEE      Name: David Srivastava                  Past Medical History:   Diagnosis Date    Abnormal Pap smear of cervix      Past Surgical History:   Procedure Laterality Date     SECTION      CHOLECYSTECTOMY         PTA Medications   Medication Sig    nitrofurantoin, macrocrystal-monohydrate, (MACROBID) 100 MG capsule Take 1 capsule (100 mg total) by mouth once daily.       Review of patient's allergies indicates:   Allergen Reactions    Banana Anaphylaxis        Family History     Problem Relation (Age of Onset)    Breast cancer Paternal Grandmother    Diabetes Mother        Social History Main Topics    Smoking status: Current Some Day Smoker    Smokeless tobacco: Never Used    Alcohol use No    Drug use: No    Sexual activity: Yes     Partners: Male     Review of Systems   Constitutional: Negative.  Negative for chills and fever.   Eyes: Negative.    Respiratory: Negative for shortness of breath.    Cardiovascular: Negative for chest pain.   Gastrointestinal: Negative for abdominal pain, bloating and constipation.   Endocrine: Negative.    Genitourinary: Negative for dyspareunia, frequency, menstrual problem, pelvic pain and vaginal bleeding.   Musculoskeletal: Negative for back pain.   Skin:  Negative.   Neurological: Negative for headaches.   Hematological: Negative.    Psychiatric/Behavioral: Negative.    Breast: Negative for breast mass and breast pain     Objective:     Vital Signs (Most Recent):  Temp: 97 °F (36.1 °C) (18  0559)  Pulse: 89 (04/06/18 0559)  Resp: 20 (04/06/18 0559)  BP: 118/66 (04/06/18 0559)  SpO2: 100 % (04/06/18 0559) Vital Signs (24h Range):  Temp:  [97 °F (36.1 °C)] 97 °F (36.1 °C)  Pulse:  [89] 89  Resp:  [20] 20  SpO2:  [100 %] 100 %  BP: (118)/(66) 118/66     Weight: 93.9 kg (207 lb)  Body mass index is 33.41 kg/m².    FHT: Cat 1 (reassuring)  TOCO: irregular    Physical Exam:   Constitutional: She is oriented to person, place, and time. She appears well-developed and well-nourished. No distress.    HENT:   Head: Atraumatic.    Eyes: EOM are normal. Pupils are equal, round, and reactive to light.    Neck: Normal range of motion. Neck supple.    Cardiovascular: Normal rate and regular rhythm.     Pulmonary/Chest: Effort normal. No respiratory distress. She has no wheezes.        Abdominal: Soft. She exhibits no distension and no abdominal incision. There is no tenderness.             Musculoskeletal: Normal range of motion. She exhibits no edema or tenderness.       Neurological: She is alert and oriented to person, place, and time. She has normal reflexes.    Skin: Skin is warm and dry. She is not diaphoretic.        Cervix: def  Presentation: Vertex     Significant Labs:  Lab Results   Component Value Date    GROUPTRH B POS 10/20/2015    HEPBSAG Negative 10/20/2015    STREPBCULT No Group B Streptococcus isolated 03/12/2018       I have personallly reviewed all pertinent lab results from the last 24 hours.

## 2018-04-06 NOTE — OP NOTE
DATE OF PROCEDURE:  2018.    PREOPERATIVE DIAGNOSES:  1.  Pregnancy at 39 weeks 1 day gestation.  2.  Previous  x2.  3.  Undesired fertility.    POSTOPERATIVE DIAGNOSES:  1.  Pregnancy at 39 weeks 1 day gestation.  2.  Previous  x2.  3.  Undesired fertility.  4.  Viable infant.    PROCEDURE:  Repeat low transverse  section.    SURGEON:  Andrés Thornton M.D.    ASSISTANT:  Abelardo eBasley M.D. (no resident available).    ANESTHESIA:  Spinal.    INDICATIONS:  The patient is a 27-year-old  7, para 2 at 39 weeks 1 day   gestation, who presents today for a scheduled repeat .  She has a   history of two prior C-sections and desires  for delivery of this   child.  Also, she has undesired fertility and requests a tubal ligation to be   performed at the time of .  She was aware this is a permanent,   irreversible procedure with 1 out of 200 risk of failure, increased risk of   ectopics, and post-tubal syndrome.  Her pregnancy course has been remarkable for   pyelonephritis at 16 weeks, after which she has been on Macrobid prophylaxis   each night.  We reviewed all risks and benefits of the planned procedure.    Questions have been answered and consent signed and witnessed.    PROCEDURE IN DETAIL:  Having obtained adequate informed consent, the patient was   taken to the Operating Room.  She was placed on the operating room table.    After an adequate level of spinal anesthesia was obtained, the abdomen was   prepped and draped in the usual sterile manner.  A Zee catheter had already   been inserted into the bladder.  Her old Pfannenstiel skin incision was then   excised and the incision carried down sharply through the subcutaneous tissue to   the fascia.  The fascia was incised bilaterally.  The fascia was then dissected   superiorly and inferiorly off the rectus muscles, which were divided in the   midline.  Peritoneum was identified and entered.  It  was incised superiorly and   inferiorly.  This allowed exposure into the pelvis.  The bladder flap was created   with sharp dissection.  The bladder blade was placed   inside the bladder flap to protect the bladder.  A low transverse uterine   incision was then made and carried up bilaterally with finger fracture   technique.  Membranes were ruptured with recovery of clear fluid.  A viable   female infant was delivered from the vertex presentation.  The cord was clamped   and cut and the infant handed off to the nurses in attendance.  Cord blood and   gas were obtained.  Placenta was allowed to expel spontaneously.  Uterus was   exteriorized.  The uterine cavity was wiped clean with a moist lap.  It was   noted that there was an approximately 2 cm midline extension downwards of the   uterine incision inferiorly.  This was repaired with running locking #1 chromic.    The uterine incision was then repaired in a running locking manner with #1   chromic.  Good hemostasis was noted along the uterine incision.  We now turned   to the tubal ligation.  The right fallopian tube was identified, traced to its   fimbriated end.  A 2 to 3 cm segment in midportion of tube was grasped with   Vinson, doubly tied with 2-0 chromic.  The knuckle of tube was excised with the   Metzenbaum scissors.  The tubal ostia were hemostatic and well .  The   left fallopian tube was noted to be fairly adherent to the uterus with minimal   mesosalpinx.  We were able to grasp the mid portion of the tube with a Vinson,   elevated, created a window in the mesosalpinx with Bovie.  Each side of the   window was doubly tied with 2-0 chromic.  The tubal segment in between these   sutures was then excised with the Metzenbaum scissors.  The tubal ostia were   hemostatic and well .  The uterus was then replaced into the abdominal   cavity again.  Lateral gutters were irrigated, all clots and debris removed.    Again, inspection of the  tubal sites were noted both to be hemostatic.  Uterine   incision was dry.  We now turned to close the abdominal wall.  Peritoneum was   closed with running 2-0 Vicryl.  The rectus muscles were replaced in midline   with several interrupted 0 chromics.  Inspection subfascially noted all areas to   be dry.  The fascia was then reapproximated with #1 PDS, two sutures were used,   each starting at the angle and meeting in the midline.  Subcutaneous tissue was   irrigated, made hemostatic with electrocautery.  Subcutaneous tissue was   reapproximated with interrupted 2-0 Vicryls.  Finally, the skin was   reapproximated with 4-0 Monocryl stitch.  It was hemostatic.  A pressure   dressing was placed atop the incision.  Estimated blood loss was approximately   500 mL.  There were no complications.  All counts were correct.  She had clear   urine throughout the entire procedure.      WTS/IN  dd: 04/06/2018 08:31:33 (CDT)  td: 04/06/2018 12:24:06 (CDT)  Doc ID   #[id number]  Job ID #[id number]    CC:

## 2018-04-06 NOTE — H&P
[facility]  Obstetrics  History & Physical    Patient Name: Shaye Bermudez  MRN: [id number]  Admission Date: 2018  Primary Care Provider: Primary Doctor No    Subjective:     Principal Problem:H/O  section    History of Present Illness:   Shaye Bermudez is a 27 y.o.  female with IUP at 39w1d weeks gestation who is admitted for scheduled  Section with BTL secondary to prior c/s delivery x2.     This IUP is complicated by tobacco use, pyelonephritis at 16 wga, and prior c/s x2.  Patient denies contractions, denies vaginal bleeding, denies LOF.   Fetal Movement: normal.           Obstetric History       T2      L2     SAB3   TAB0   Ectopic0   Multiple0   Live Births2       # Outcome Date GA Lbr Colby/2nd Weight Sex Delivery Anes PTL Lv   7 Current            6 Term 10/24/14 39w1d  3.35 kg (7 lb 6.2 oz) F CS-LTranv Spinal N KAILEE      Apgar1:  9                Apgar5: 9   5 SAB 2013 6w0d          4 SAB 2008 8w0d          3 SAB 2008 7w0d          2 Term 07 38w0d 36:00 3.515 kg (7 lb 12 oz) M CS-LTranv EPI N KAILEE      Name: David Srivastava                  Past Medical History:   Diagnosis Date    Abnormal Pap smear of cervix      Past Surgical History:   Procedure Laterality Date     SECTION      CHOLECYSTECTOMY         PTA Medications   Medication Sig    nitrofurantoin, macrocrystal-monohydrate, (MACROBID) 100 MG capsule Take 1 capsule (100 mg total) by mouth once daily.       Review of patient's allergies indicates:   Allergen Reactions    Banana Anaphylaxis        Family History     Problem Relation (Age of Onset)    Breast cancer Paternal Grandmother    Diabetes Mother        Social History Main Topics    Smoking status: Current Some Day Smoker    Smokeless tobacco: Never Used    Alcohol use No    Drug use: No    Sexual activity: Yes     Partners: Male     Review of Systems   Constitutional: Negative.  Negative for  chills and fever.   Eyes: Negative.    Respiratory: Negative for shortness of breath.    Cardiovascular: Negative for chest pain.   Gastrointestinal: Negative for abdominal pain, bloating and constipation.   Endocrine: Negative.    Genitourinary: Negative for dyspareunia, frequency, menstrual problem, pelvic pain and vaginal bleeding.   Musculoskeletal: Negative for back pain.   Skin:  Negative.   Neurological: Negative for headaches.   Hematological: Negative.    Psychiatric/Behavioral: Negative.    Breast: Negative for breast mass and breast pain     Objective:     Vital Signs (Most Recent):  Temp: 97 °F (36.1 °C) (04/06/18 0559)  Pulse: 89 (04/06/18 0559)  Resp: 20 (04/06/18 0559)  BP: 118/66 (04/06/18 0559)  SpO2: 100 % (04/06/18 0559) Vital Signs (24h Range):  Temp:  [97 °F (36.1 °C)] 97 °F (36.1 °C)  Pulse:  [89] 89  Resp:  [20] 20  SpO2:  [100 %] 100 %  BP: (118)/(66) 118/66     Weight: 93.9 kg (207 lb)  Body mass index is 33.41 kg/m².    FHT: Cat 1 (reassuring)  TOCO: irregular    Physical Exam:   Constitutional: She is oriented to person, place, and time. She appears well-developed and well-nourished. No distress.    HENT:   Head: Atraumatic.    Eyes: EOM are normal. Pupils are equal, round, and reactive to light.    Neck: Normal range of motion. Neck supple.    Cardiovascular: Normal rate and regular rhythm.     Pulmonary/Chest: Effort normal. No respiratory distress. She has no wheezes.        Abdominal: Soft. She exhibits no distension and no abdominal incision. There is no tenderness.             Musculoskeletal: Normal range of motion. She exhibits no edema or tenderness.       Neurological: She is alert and oriented to person, place, and time. She has normal reflexes.    Skin: Skin is warm and dry. She is not diaphoretic.        Cervix: def  Presentation: Vertex     Significant Labs:  Lab Results   Component Value Date    GROUPTRH B POS 10/20/2015    HEPBSAG Negative 10/20/2015    STREPBCULT No Group  B Streptococcus isolated 2018       I have personallly reviewed all pertinent lab results from the last 24 hours.    Assessment/Plan:     27 y.o. female  at 39w1d for:    * H/O  section    - Consents signed and to chart  - Admit to Labor and Delivery unit  - Epidural per Anesthesia  - Draw CBC, T&S  - Ancef OCTOR  - To OR for C/S. Case Request is in.   - Notify Staff  - Ultrasound performed, infant in vertex position.   - Post-Partum Hemorrhage risk - low            Damir Dacosta MD  Obstetrics  [facility]

## 2018-04-06 NOTE — LACTATION NOTE
"   04/06/18 1310   Maternal Infant Assessment   Breast Density Right:;soft   Areola Right:;elastic   Nipple(s) Right:;graspable;everted   Infant Assessment   Sucking Reflex present   Rooting Reflex present   Swallow Reflex present   LATCH Score   Latch 2-->grasps breast, tongue down, lips flanged, rhythmic sucking   Audible Swallowing 1-->a few with stimulation   Type Of Nipple 2-->everted (after stimulation)   Comfort (Breast/Nipple) 2-->soft/nontender   Hold (Positioning) 1-->minimal assist, teach one side: mother does other, staff holds   Score (less than 7 for 2/more consecutive times, consult Lactation Consultant) 8   Pain/Comfort Assessments   Pain Assessment Performed Yes       Number Scale   Presence of Pain denies   Location nipple(s)   Pain Rating: Activity 0  (feels "less iritated" with latch assistance)   Maternal Infant Feeding   Maternal Emotional State relaxed   Infant Positioning cross-cradle  (R)   Signs of Milk Transfer infant jaw motion present   Time Spent (min) 15-30 min   Latch Assistance yes  (minimal to achieve deeper latch c mouth open wider)   Feeding Infant   Feeding Readiness Cues eager;rooting;sucking motion present   Effective Latch During Feeding yes   Suck/Swallow Coordination present   Skin-to-Skin Contact During Feeding no   Lactation Referrals   Lactation Consult Breastfeeding assessment;Initial assessment;Knowledge deficit   Lactation Interventions   Attachment Promotion breastfeeding assistance provided;counseling provided;family involvement promoted;privacy provided;rooming-in promoted;skin-to-skin contact encouraged   Breastfeeding Assistance assisted with positioning;feeding cue recognition promoted;feeding on demand promoted;feeding session observed;infant latch-on verified;infant suck/swallow verified;support offered   Maternal Breastfeeding Support encouragement offered;lactation counseling provided;maternal hydration promoted;maternal nutrition promoted;maternal rest " encouraged   Latch Promotion positioning assisted;infant moved to breast;suck stimulated with colostrum drop

## 2018-04-06 NOTE — TRANSFER OF CARE
"Anesthesia Transfer of Care Note    Patient: Shaye Bermudez    Procedure(s) Performed: Procedure(s) (LRB):  DELIVERY- SECTION WITH TUBAL (N/A)    Patient location: Labor and Delivery    Anesthesia Type: CSE    Transport from OR: Transported from OR on room air with adequate spontaneous ventilation    Post pain: adequate analgesia    Post assessment: no apparent anesthetic complications    Post vital signs: stable    Level of consciousness: awake    Nausea/Vomiting: no nausea/vomiting    Complications: none    Transfer of care protocol was followed      Last vitals:   Visit Vitals  /66   Pulse 89   Temp 36.1 °C (97 °F) (Temporal)   Resp 20   Ht 5' 6" (1.676 m)   Wt 93.9 kg (207 lb)   LMP 2017 (LMP Unknown)   SpO2 100%   Breastfeeding? Yes   BMI 33.41 kg/m²     "

## 2018-04-07 LAB
BASOPHILS # BLD AUTO: 0.04 K/UL
BASOPHILS NFR BLD: 0.3 %
DIFFERENTIAL METHOD: ABNORMAL
EOSINOPHIL # BLD AUTO: 0.1 K/UL
EOSINOPHIL NFR BLD: 0.9 %
ERYTHROCYTE [DISTWIDTH] IN BLOOD BY AUTOMATED COUNT: 14.2 %
HCT VFR BLD AUTO: 36.8 %
HGB BLD-MCNC: 12.5 G/DL
LYMPHOCYTES # BLD AUTO: 3.2 K/UL
LYMPHOCYTES NFR BLD: 21.6 %
MCH RBC QN AUTO: 30.5 PG
MCHC RBC AUTO-ENTMCNC: 34 G/DL
MCV RBC AUTO: 90 FL
MONOCYTES # BLD AUTO: 0.5 K/UL
MONOCYTES NFR BLD: 3.6 %
NEUTROPHILS # BLD AUTO: 11 K/UL
NEUTROPHILS NFR BLD: 73.1 %
PLATELET # BLD AUTO: 236 K/UL
PMV BLD AUTO: 9.7 FL
RBC # BLD AUTO: 4.1 M/UL
WBC # BLD AUTO: 14.99 K/UL

## 2018-04-07 PROCEDURE — 11000001 HC ACUTE MED/SURG PRIVATE ROOM

## 2018-04-07 PROCEDURE — 63600175 PHARM REV CODE 636 W HCPCS: Performed by: STUDENT IN AN ORGANIZED HEALTH CARE EDUCATION/TRAINING PROGRAM

## 2018-04-07 PROCEDURE — 99231 SBSQ HOSP IP/OBS SF/LOW 25: CPT | Mod: ,,, | Performed by: OBSTETRICS & GYNECOLOGY

## 2018-04-07 PROCEDURE — 25000003 PHARM REV CODE 250: Performed by: STUDENT IN AN ORGANIZED HEALTH CARE EDUCATION/TRAINING PROGRAM

## 2018-04-07 PROCEDURE — 85025 COMPLETE CBC W/AUTO DIFF WBC: CPT

## 2018-04-07 PROCEDURE — 36415 COLL VENOUS BLD VENIPUNCTURE: CPT

## 2018-04-07 RX ORDER — HYDROCODONE BITARTRATE AND ACETAMINOPHEN 10; 325 MG/1; MG/1
1 TABLET ORAL EVERY 6 HOURS PRN
Status: DISCONTINUED | OUTPATIENT
Start: 2018-04-07 | End: 2018-04-07

## 2018-04-07 RX ORDER — OXYCODONE AND ACETAMINOPHEN 10; 325 MG/1; MG/1
1 TABLET ORAL EVERY 4 HOURS PRN
Status: DISCONTINUED | OUTPATIENT
Start: 2018-04-07 | End: 2018-04-08 | Stop reason: HOSPADM

## 2018-04-07 RX ORDER — HYDROCODONE BITARTRATE AND ACETAMINOPHEN 5; 325 MG/1; MG/1
1 TABLET ORAL EVERY 6 HOURS PRN
Status: DISCONTINUED | OUTPATIENT
Start: 2018-04-07 | End: 2018-04-07

## 2018-04-07 RX ORDER — IBUPROFEN 600 MG/1
600 TABLET ORAL
Status: DISCONTINUED | OUTPATIENT
Start: 2018-04-07 | End: 2018-04-08 | Stop reason: HOSPADM

## 2018-04-07 RX ORDER — OXYCODONE AND ACETAMINOPHEN 5; 325 MG/1; MG/1
1 TABLET ORAL EVERY 4 HOURS PRN
Qty: 20 TABLET | Refills: 0 | Status: SHIPPED | OUTPATIENT
Start: 2018-04-07 | End: 2023-09-07

## 2018-04-07 RX ORDER — DOCUSATE SODIUM 100 MG/1
200 CAPSULE, LIQUID FILLED ORAL 2 TIMES DAILY PRN
Status: DISCONTINUED | OUTPATIENT
Start: 2018-04-07 | End: 2018-04-08 | Stop reason: HOSPADM

## 2018-04-07 RX ORDER — IBUPROFEN 600 MG/1
600 TABLET ORAL EVERY 6 HOURS
Qty: 60 TABLET | Refills: 2 | Status: SHIPPED | OUTPATIENT
Start: 2018-04-07 | End: 2023-09-07

## 2018-04-07 RX ORDER — OXYCODONE AND ACETAMINOPHEN 5; 325 MG/1; MG/1
1 TABLET ORAL EVERY 4 HOURS PRN
Status: DISCONTINUED | OUTPATIENT
Start: 2018-04-07 | End: 2018-04-08 | Stop reason: HOSPADM

## 2018-04-07 RX ORDER — DOCUSATE SODIUM 100 MG/1
100 CAPSULE, LIQUID FILLED ORAL DAILY PRN
Status: DISCONTINUED | OUTPATIENT
Start: 2018-04-07 | End: 2018-04-07

## 2018-04-07 RX ADMIN — ACETAMINOPHEN 650 MG: 325 TABLET ORAL at 01:04

## 2018-04-07 RX ADMIN — IBUPROFEN 600 MG: 600 TABLET ORAL at 11:04

## 2018-04-07 RX ADMIN — KETOROLAC TROMETHAMINE 30 MG: 30 INJECTION, SOLUTION INTRAMUSCULAR at 01:04

## 2018-04-07 RX ADMIN — OXYCODONE AND ACETAMINOPHEN 1 TABLET: 5; 325 TABLET ORAL at 10:04

## 2018-04-07 RX ADMIN — IBUPROFEN 600 MG: 600 TABLET ORAL at 06:04

## 2018-04-07 RX ADMIN — OXYCODONE HYDROCHLORIDE 5 MG: 5 TABLET ORAL at 08:04

## 2018-04-07 RX ADMIN — HYDROCODONE BITARTRATE AND ACETAMINOPHEN 1 TABLET: 10; 325 TABLET ORAL at 11:04

## 2018-04-07 RX ADMIN — OXYCODONE HYDROCHLORIDE 5 MG: 5 TABLET ORAL at 05:04

## 2018-04-07 RX ADMIN — OXYCODONE HYDROCHLORIDE AND ACETAMINOPHEN 1 TABLET: 10; 325 TABLET ORAL at 01:04

## 2018-04-07 RX ADMIN — OXYCODONE HYDROCHLORIDE AND ACETAMINOPHEN 1 TABLET: 10; 325 TABLET ORAL at 06:04

## 2018-04-07 RX ADMIN — DOCUSATE SODIUM 200 MG: 100 CAPSULE, LIQUID FILLED ORAL at 10:04

## 2018-04-07 RX ADMIN — ACETAMINOPHEN 650 MG: 325 TABLET ORAL at 08:04

## 2018-04-07 NOTE — ANESTHESIA POSTPROCEDURE EVALUATION
"Anesthesia Post Evaluation    Patient: Shaye Bermudez    Procedure(s) Performed: Procedure(s) (LRB):  DELIVERY- SECTION WITH TUBAL (N/A)    Final Anesthesia Type: CSE  Patient location during evaluation: floor  Patient participation: Yes- Able to Participate  Level of consciousness: awake and alert and oriented  Post-procedure vital signs: reviewed and stable  Pain management: adequate  Airway patency: patent  PONV status at discharge: No PONV  Anesthetic complications: no      Cardiovascular status: blood pressure returned to baseline  Respiratory status: unassisted  Hydration status: euvolemic          Visit Vitals  /67   Pulse 81   Temp 36.4 °C (97.6 °F) (Oral)   Resp 18   Ht 5' 6" (1.676 m)   Wt 93.9 kg (207 lb)   LMP 2017 (LMP Unknown)   SpO2 98%   Breastfeeding? Yes   BMI 33.41 kg/m²       Pain/Aida Score: Pain Rating Prior to Med Admin: 7 (2018 11:32 AM)  Pain Rating Post Med Admin: 4 (2018  9:00 AM)      "

## 2018-04-07 NOTE — HOSPITAL COURSE
2018 -  Scheduled repeat  section, uncomplicated  2018 - POD # 1 S/P Repeat . Patient doing well. Starting to meet post-operative goals.   2018 - POD #2, doing well, no complaints. Meeting post op milestones.

## 2018-04-07 NOTE — SUBJECTIVE & OBJECTIVE
Hospital course: 2018 -  Scheduled repeat  section, uncomplicated  2018 - POD # 1 S/P Repeat . Patient doing well. Starting to meet post-operative goals.       Interval History:     She is doing well this morning. She is tolerating a regular diet without nausea or vomiting. She is voiding spontaneously. She is ambulating. She has passed flatus, and has not a BM. Vaginal bleeding is mild. She denies fever or chills. Abdominal pain is mild and controlled with oral medications. She is breastfeeding.     Objective:     Vital Signs (Most Recent):  Temp: 97.8 °F (36.6 °C) (18 0350)  Pulse: 78 (18 0350)  Resp: 18 (18 0350)  BP: 108/61 (18 0350)  SpO2: 98 % (18 2245) Vital Signs (24h Range):  Temp:  [95.7 °F (35.4 °C)-98.3 °F (36.8 °C)] 97.8 °F (36.6 °C)  Pulse:  [61-84] 78  Resp:  [16-18] 18  SpO2:  [97 %-100 %] 98 %  BP: ()/(53-69) 108/61     Weight: 93.9 kg (207 lb)  Body mass index is 33.41 kg/m².      Intake/Output Summary (Last 24 hours) at 18 0754  Last data filed at 18 0138   Gross per 24 hour   Intake              600 ml   Output             1800 ml   Net            -1200 ml       Significant Labs:  Lab Results   Component Value Date    GROUPTRH B POS 2018    HEPBSAG Negative 2018    STREPBCULT No Group B Streptococcus isolated 2018       Recent Labs  Lab 18  0718   HGB 12.5   HCT 36.8*       I have personallly reviewed all pertinent lab results from the last 24 hours.    Physical Exam:   Constitutional: She is oriented to person, place, and time. She appears well-developed and well-nourished. No distress.    HENT:   Head: Normocephalic and atraumatic.    Eyes: Conjunctivae are normal.    Neck: Neck supple.    Cardiovascular: Normal rate, regular rhythm and intact distal pulses.     Pulmonary/Chest: Effort normal. No respiratory distress.        Abdominal: She exhibits abdominal incision (Surgical dressing present.  No strikethrough noted.  No signs of infection or inflammation. ). She exhibits no distension. There is no tenderness. There is no rebound and no guarding.             Musculoskeletal: Moves all extremeties. She exhibits no edema.       Neurological: She is alert and oriented to person, place, and time.    Skin: Skin is warm and dry. She is not diaphoretic.    Psychiatric: She has a normal mood and affect. Her behavior is normal. Judgment and thought content normal.

## 2018-04-07 NOTE — PROGRESS NOTES
[facility]  Obstetrics  Postpartum Progress Note    Patient Name: Shaye Bermudez  MRN: [id number]  Admission Date: 2018  Hospital Length of Stay: 1 days  Attending Physician: Andrés Thornton MD  Primary Care Provider: Primary Doctor No    Subjective:     Principal Problem:H/O  section    Hospital course: 2018 -  Scheduled repeat  section, uncomplicated  2018 - POD # 1 S/P Repeat . Patient doing well. Starting to meet post-operative goals.       Interval History:     She is doing well this morning. She is tolerating a regular diet without nausea or vomiting. She is voiding spontaneously. She is ambulating. She has passed flatus, and has not a BM. Vaginal bleeding is mild. She denies fever or chills. Abdominal pain is mild and controlled with oral medications. She is breastfeeding.     Objective:     Vital Signs (Most Recent):  Temp: 97.8 °F (36.6 °C) (18 0350)  Pulse: 78 (18 0350)  Resp: 18 (18 0350)  BP: 108/61 (18 0350)  SpO2: 98 % (18 2245) Vital Signs (24h Range):  Temp:  [95.7 °F (35.4 °C)-98.3 °F (36.8 °C)] 97.8 °F (36.6 °C)  Pulse:  [61-84] 78  Resp:  [16-18] 18  SpO2:  [97 %-100 %] 98 %  BP: ()/(53-69) 108/61     Weight: 93.9 kg (207 lb)  Body mass index is 33.41 kg/m².      Intake/Output Summary (Last 24 hours) at 18 0754  Last data filed at 18 0138   Gross per 24 hour   Intake              600 ml   Output             1800 ml   Net            -1200 ml       Significant Labs:  Lab Results   Component Value Date    GROUPTRH B POS 2018    HEPBSAG Negative 2018    STREPBCULT No Group B Streptococcus isolated 2018       Recent Labs  Lab 18  0718   HGB 12.5   HCT 36.8*       I have personallly reviewed all pertinent lab results from the last 24 hours.    Physical Exam:   Constitutional: She is oriented to person, place, and time. She appears well-developed and well-nourished.  No distress.    HENT:   Head: Normocephalic and atraumatic.    Eyes: Conjunctivae are normal.    Neck: Neck supple.    Cardiovascular: Normal rate, regular rhythm and intact distal pulses.     Pulmonary/Chest: Effort normal. No respiratory distress.        Abdominal: She exhibits abdominal incision (Surgical dressing present. No strikethrough noted.  No signs of infection or inflammation. ). She exhibits no distension. There is no tenderness. There is no rebound and no guarding.             Musculoskeletal: Moves all extremeties. She exhibits no edema.       Neurological: She is alert and oriented to person, place, and time.    Skin: Skin is warm and dry. She is not diaphoretic.    Psychiatric: She has a normal mood and affect. Her behavior is normal. Judgment and thought content normal.       Assessment/Plan:     27 y.o. female  for:    * H/O  section    - s/p Repeat          delivery delivered    Postpartum care:  - Patient doing well. Continue routine management and advances.  - Transition to PO pain meds at 24 hours. Pain well controlled.  - Encourage ambulation.   - Heme: Pre Delivery h/h  --> Post Delivery h/h   - Contraception - Per Dr. Thornton  - Lactation - The patient is breast feeding. Lactation nurse following along PRN  - Rh Status - B POS             Disposition: As patient meets milestones, will plan to discharge POD 3-4.    Kuldeep Schultz MD  Obstetrics  [facility]

## 2018-04-07 NOTE — ASSESSMENT & PLAN NOTE
Postpartum care:  - Patient doing well. Continue routine management and advances.  - Transition to PO pain meds at 24 hours. Pain well controlled.  - Encourage ambulation.   - Heme: Pre Delivery h/h 12/38 --> Post Delivery h/h 12/36  - Contraception - Per Dr. Thornton  - Lactation - The patient is breast feeding. Lactation nurse following along PRN  - Rh Status - B POS

## 2018-04-07 NOTE — LACTATION NOTE
LC visit to mother's room. Mother states her areola's are swollen and she is having trouble getting baby to the breast. LC showed her reverse pressure softening to help dannie nipple. LC left phone number on mother's white board for mother to call for asst as needed.Told mother what time LC leaves the floor. Mother also told that LC can see when she calls [employer] phone and if LC does not answer, she is busy but will come as soon as possible.

## 2018-04-08 VITALS
DIASTOLIC BLOOD PRESSURE: 72 MMHG | HEIGHT: 66 IN | OXYGEN SATURATION: 95 % | BODY MASS INDEX: 33.27 KG/M2 | HEART RATE: 82 BPM | WEIGHT: 207 LBS | RESPIRATION RATE: 18 BRPM | SYSTOLIC BLOOD PRESSURE: 123 MMHG | TEMPERATURE: 99 F

## 2018-04-08 PROCEDURE — 99238 HOSP IP/OBS DSCHRG MGMT 30/<: CPT | Mod: ,,, | Performed by: OBSTETRICS & GYNECOLOGY

## 2018-04-08 PROCEDURE — 90471 IMMUNIZATION ADMIN: CPT | Performed by: STUDENT IN AN ORGANIZED HEALTH CARE EDUCATION/TRAINING PROGRAM

## 2018-04-08 PROCEDURE — 90710 MMRV VACCINE SC: CPT | Performed by: STUDENT IN AN ORGANIZED HEALTH CARE EDUCATION/TRAINING PROGRAM

## 2018-04-08 PROCEDURE — 90715 TDAP VACCINE 7 YRS/> IM: CPT | Performed by: STUDENT IN AN ORGANIZED HEALTH CARE EDUCATION/TRAINING PROGRAM

## 2018-04-08 PROCEDURE — 25000003 PHARM REV CODE 250: Performed by: STUDENT IN AN ORGANIZED HEALTH CARE EDUCATION/TRAINING PROGRAM

## 2018-04-08 PROCEDURE — 90472 IMMUNIZATION ADMIN EACH ADD: CPT | Performed by: STUDENT IN AN ORGANIZED HEALTH CARE EDUCATION/TRAINING PROGRAM

## 2018-04-08 PROCEDURE — 63600175 PHARM REV CODE 636 W HCPCS: Performed by: STUDENT IN AN ORGANIZED HEALTH CARE EDUCATION/TRAINING PROGRAM

## 2018-04-08 RX ADMIN — OXYCODONE AND ACETAMINOPHEN 1 TABLET: 5; 325 TABLET ORAL at 06:04

## 2018-04-08 RX ADMIN — MEASLES, MUMPS, AND RUBELLA VIRUS VACCINE LIVE 0.5 ML: 1000; 12500; 1000 INJECTION, POWDER, LYOPHILIZED, FOR SUSPENSION SUBCUTANEOUS at 12:04

## 2018-04-08 RX ADMIN — IBUPROFEN 600 MG: 600 TABLET ORAL at 12:04

## 2018-04-08 RX ADMIN — OXYCODONE AND ACETAMINOPHEN 1 TABLET: 5; 325 TABLET ORAL at 02:04

## 2018-04-08 RX ADMIN — OXYCODONE AND ACETAMINOPHEN 1 TABLET: 5; 325 TABLET ORAL at 12:04

## 2018-04-08 RX ADMIN — IBUPROFEN 600 MG: 600 TABLET ORAL at 06:04

## 2018-04-08 RX ADMIN — CLOSTRIDIUM TETANI TOXOID ANTIGEN (FORMALDEHYDE INACTIVATED), CORYNEBACTERIUM DIPHTHERIAE TOXOID ANTIGEN (FORMALDEHYDE INACTIVATED), BORDETELLA PERTUSSIS TOXOID ANTIGEN (GLUTARALDEHYDE INACTIVATED), BORDETELLA PERTUSSIS FILAMENTOUS HEMAGGLUTININ ANTIGEN (FORMALDEHYDE INACTIVATED), BORDETELLA PERTUSSIS PERTACTIN ANTIGEN, AND BORDETELLA PERTUSSIS FIMBRIAE 2/3 ANTIGEN 0.5 ML: 5; 2; 2.5; 5; 3; 5 INJECTION, SUSPENSION INTRAMUSCULAR at 12:04

## 2018-04-08 NOTE — ASSESSMENT & PLAN NOTE
Postpartum care:  - Patient doing well. Continue routine management and advances.  - Pain well controlled on PO meds  - Encourage ambulation.   - Heme: Pre Delivery h/h 12/38 --> Post Delivery h/h 12/36  - Contraception - Per Dr. Thornton  - Lactation - The patient is breast feeding. Lactation nurse following along PRN  - Rh Status - B POS

## 2018-04-08 NOTE — PROGRESS NOTES
[facility]  Obstetrics  Postpartum Progress Note    Patient Name: Shaye Bermudez  MRN: [id number]  Admission Date: 2018  Hospital Length of Stay: 2 days  Attending Physician: Andrés Thornton MD  Primary Care Provider: Primary Doctor No    Subjective:     Principal Problem:H/O  section    Hospital course: 2018 -  Scheduled repeat  section, uncomplicated  2018 - POD # 1 S/P Repeat . Patient doing well. Starting to meet post-operative goals.   2018 - POD #2, doing well, no complaints. Meeting post op milestones.      Interval History:     She is doing well this morning. She is tolerating a regular diet without nausea or vomiting. She is voiding spontaneously. She is ambulating. She has passed flatus, and has not a BM. Vaginal bleeding is mild. She denies fever or chills. Abdominal pain is mild and controlled with oral medications. She is breastfeeding.     Objective:     Vital Signs (Most Recent):  Temp: 98.2 °F (36.8 °C) (18 0000)  Pulse: 87 (18 0000)  Resp: 16 (18 0000)  BP: (!) 109/56 (18 0000)  SpO2: 97 % (18 0000) Vital Signs (24h Range):  Temp:  [98 °F (36.7 °C)-98.2 °F (36.8 °C)] 98.2 °F (36.8 °C)  Pulse:  [81-87] 87  Resp:  [16] 16  SpO2:  [96 %-97 %] 97 %  BP: (106-109)/(56-58) 109/56     Weight: 93.9 kg (207 lb)  Body mass index is 33.41 kg/m².    No intake or output data in the 24 hours ending 18 0817    Significant Labs:  Lab Results   Component Value Date    GROUPTRH B POS 2018    HEPBSAG Negative 2018    STREPBCULT No Group B Streptococcus isolated 2018       Recent Labs  Lab 18  0718   HGB 12.5   HCT 36.8*       I have personallly reviewed all pertinent lab results from the last 24 hours.    Physical Exam:   Constitutional: She is oriented to person, place, and time. She appears well-developed and well-nourished. No distress.    HENT:   Head: Normocephalic and atraumatic.     Eyes: Conjunctivae are normal.    Neck: Neck supple.    Cardiovascular: Normal rate, regular rhythm and intact distal pulses.     Pulmonary/Chest: Effort normal. No respiratory distress.        Abdominal: Soft. Bowel sounds are normal. She exhibits abdominal incision (clean, dry, intact ). She exhibits no distension. There is no tenderness. There is no rebound and no guarding.             Musculoskeletal: Moves all extremeties. She exhibits no edema.       Neurological: She is alert and oriented to person, place, and time.    Skin: Skin is warm and dry. She is not diaphoretic.    Psychiatric: She has a normal mood and affect. Her behavior is normal. Judgment and thought content normal.       Assessment/Plan:     27 y.o. female  for:    * H/O  section    - s/p Repeat          delivery delivered    Postpartum care:  - Patient doing well. Continue routine management and advances.  - Pain well controlled on PO meds  - Encourage ambulation.   - Heme: Pre Delivery h/h 12/38 --> Post Delivery h/h 12/36  - Contraception - Per Dr. Thornton  - Lactation - The patient is breast feeding. Lactation nurse following along PRN  - Rh Status - B POS             Disposition: As patient has met milestones, will plan to discharge on POD #3.    Shaji Perdue MD  Obstetrics  [facility]

## 2018-04-08 NOTE — DISCHARGE SUMMARY
[facility]  Obstetrics  Discharge Summary      Patient Name: Shaye Bermudez  MRN: [id number]  Admission Date: 2018  Hospital Length of Stay: 2 days  Discharge Date and Time:  2018 11:48 AM  Attending Physician: Andrés Thornton MD   Discharging Provider: Kuldeep Schultz MD  Primary Care Provider: Primary Doctor No    HPI:  Shaye Bermudez is a 27 y.o.  female with IUP at 39w1d weeks gestation who is admitted for scheduled  Section with BTL secondary to prior c/s delivery x2.     This IUP is complicated by tobacco use, pyelonephritis at 16 wga, and prior c/s x2.  Patient denies contractions, denies vaginal bleeding, denies LOF.   Fetal Movement: normal.       Procedure(s) (LRB):  DELIVERY- SECTION WITH TUBAL (N/A)     Hospital Course:   2018 -  Scheduled repeat  section, uncomplicated  2018 - POD # 1 S/P Repeat . Patient doing well. Starting to meet post-operative goals.   2018 - POD #2, doing well, no complaints. Meeting post op milestones.    Consults         Status Ordering Provider     Inpatient consult to Anesthesiology  Once     Provider:  (Not yet assigned)    Acknowledged YAHIR DEAN     Inpatient consult to Lactation  Once     Provider:  (Not yet assigned)    Acknowledged ANDRÉS THORNTON          Final Active Diagnoses:    Diagnosis Date Noted POA    PRINCIPAL PROBLEM:  H/O  section [Z98.891] 2018 Not Applicable     delivery delivered  [O82] 2018 No      Problems Resolved During this Admission:    Diagnosis Date Noted Date Resolved POA        Labs: All labs within the past 24 hours have been reviewed    Feeding Method: breast    Immunizations     Date Immunization Status Dose Route/Site Given by    18 1237 MMR Incomplete 0.5 mL Subcutaneous/Left deltoid     18 1237 Tdap Incomplete 0.5 mL Intramuscular/Left deltoid           Delivery:    Episiotomy: None    Lacerations:     Repair suture:     Repair # of packets: 13   Blood loss (ml): 0     Birth information:  YOB: 2020   Time of birth: 7:34 AM   Sex: female   Delivery type: , Low Transverse   Gestational Age: 39w1d    Delivery Clinician:      Other providers:       Additional  information:  Forceps:    Vacuum:    Breech:    Observed anomalies      Living?:           APGARS  One minute Five minutes Ten minutes   Skin color:         Heart rate:         Grimace:         Muscle tone:         Breathing:         Totals: 9  9        Placenta: Delivered:       appearance    Pending Diagnostic Studies:     Procedure Component Value Units Date/Time    Rubella antibody, IgG [[id number]] Collected:  18 0550    Order Status:  Sent Lab Status:  In process Updated:  18 0754    Specimen:  Blood from Blood           Discharged Condition: good    Disposition: Home or Self Care    Follow Up:  Follow-up Information     Andrés Thornton MD. Schedule an appointment as soon as possible for a visit in 6 weeks.    Specialties:  Obstetrics, Obstetrics and Gynecology  Why:  for post partum visit  Contact information:  [address] [place] [address] [id number] [phone]                 Patient Instructions:     Activity as tolerated     Other restrictions (specify):   Order Comments: Pelvic Rest - Nothing in the Vagina for 6 weeks.     Notify your health care provider if you experience any of the following:   Order Comments: Vaginal Bleeding greater than a pad per hour.     Notify your health care provider if you experience any of the following:  increased confusion or weakness     Notify your health care provider if you experience any of the following:  persistent dizziness, light-headedness, or visual disturbances     Notify your health care provider if you experience any of the following:  worsening rash     Notify your health care provider if you experience any of the following:  severe  persistent headache     Notify your health care provider if you experience any of the following:  difficulty breathing or increased cough     Notify your health care provider if you experience any of the following:  redness, tenderness, or signs of infection (pain, swelling, redness, odor or green/yellow discharge around incision site)     Notify your health care provider if you experience any of the following:  persistent nausea and vomiting or diarrhea     Notify your health care provider if you experience any of the following:  temperature >100.4     Notify your health care provider if you experience any of the following:  severe uncontrolled pain       Medications:  Current Discharge Medication List      START taking these medications    Details   ibuprofen (ADVIL,MOTRIN) 600 MG tablet Take 1 tablet (600 mg total) by mouth every 6 (six) hours.  Qty: 60 tablet, Refills: 2      oxyCODONE-acetaminophen (PERCOCET) 5-325 mg per tablet Take 1 tablet by mouth every 4 (four) hours as needed.  Qty: 20 tablet, Refills: 0         CONTINUE these medications which have NOT CHANGED    Details   nitrofurantoin, macrocrystal-monohydrate, (MACROBID) 100 MG capsule Take 1 capsule (100 mg total) by mouth once daily.  Qty: 195 capsule, Refills: 0    Associated Diagnoses: Pyelonephritis affecting pregnancy; Encounter for supervision of normal pregnancy in second trimester, unspecified              Kuldeep Schultz MD  Obstetrics  [facility]

## 2018-04-08 NOTE — SUBJECTIVE & OBJECTIVE
Hospital course: 2018 -  Scheduled repeat  section, uncomplicated  2018 - POD # 1 S/P Repeat . Patient doing well. Starting to meet post-operative goals.   2018 - POD #2, doing well, no complaints. Meeting post op milestones.      Interval History:     She is doing well this morning. She is tolerating a regular diet without nausea or vomiting. She is voiding spontaneously. She is ambulating. She has passed flatus, and has not a BM. Vaginal bleeding is mild. She denies fever or chills. Abdominal pain is mild and controlled with oral medications. She is breastfeeding.     Objective:     Vital Signs (Most Recent):  Temp: 98.2 °F (36.8 °C) (18 0000)  Pulse: 87 (18 0000)  Resp: 16 (18 0000)  BP: (!) 109/56 (18 0000)  SpO2: 97 % (18 0000) Vital Signs (24h Range):  Temp:  [98 °F (36.7 °C)-98.2 °F (36.8 °C)] 98.2 °F (36.8 °C)  Pulse:  [81-87] 87  Resp:  [16] 16  SpO2:  [96 %-97 %] 97 %  BP: (106-109)/(56-58) 109/56     Weight: 93.9 kg (207 lb)  Body mass index is 33.41 kg/m².    No intake or output data in the 24 hours ending 18 0817    Significant Labs:  Lab Results   Component Value Date    GROUPTRH B POS 2018    HEPBSAG Negative 2018    STREPBCULT No Group B Streptococcus isolated 2018       Recent Labs  Lab 18  0718   HGB 12.5   HCT 36.8*       I have personallly reviewed all pertinent lab results from the last 24 hours.    Physical Exam:   Constitutional: She is oriented to person, place, and time. She appears well-developed and well-nourished. No distress.    HENT:   Head: Normocephalic and atraumatic.    Eyes: Conjunctivae are normal.    Neck: Neck supple.    Cardiovascular: Normal rate, regular rhythm and intact distal pulses.     Pulmonary/Chest: Effort normal. No respiratory distress.        Abdominal: Soft. Bowel sounds are normal. She exhibits abdominal incision (clean, dry, intact ). She exhibits no distension. There  is no tenderness. There is no rebound and no guarding.             Musculoskeletal: Moves all extremeties. She exhibits no edema.       Neurological: She is alert and oriented to person, place, and time.    Skin: Skin is warm and dry. She is not diaphoretic.    Psychiatric: She has a normal mood and affect. Her behavior is normal. Judgment and thought content normal.

## 2018-04-09 LAB
ALLENS TEST: ABNORMAL
HCO3 UR-SCNC: 23.9 MMOL/L (ref 24–28)
PCO2 BLDA: 64.4 MMHG (ref 35–45)
PH SMN: 7.18 [PH] (ref 7.35–7.45)
PO2 BLDA: <5 MMHG (ref 80–100)
POC BE: -5 MMOL/L
POC SATURATED O2: ABNORMAL %
RUBV IGG SER-ACNC: 15.7 IU/ML
RUBV IGG SER-IMP: REACTIVE
SAMPLE: ABNORMAL
SITE: ABNORMAL

## 2018-04-23 ENCOUNTER — OFFICE VISIT (OUTPATIENT)
Dept: OBSTETRICS AND GYNECOLOGY | Facility: CLINIC | Age: 28
End: 2018-04-23
Attending: OBSTETRICS & GYNECOLOGY
Payer: MEDICAID

## 2018-04-23 VITALS
DIASTOLIC BLOOD PRESSURE: 64 MMHG | SYSTOLIC BLOOD PRESSURE: 122 MMHG | BODY MASS INDEX: 31.22 KG/M2 | HEIGHT: 65 IN | WEIGHT: 187.38 LBS

## 2018-04-23 DIAGNOSIS — Z98.890 POSTOPERATIVE STATE: Primary | ICD-10-CM

## 2018-04-23 PROCEDURE — 99213 OFFICE O/P EST LOW 20 MIN: CPT | Mod: PBBFAC,PN | Performed by: OBSTETRICS & GYNECOLOGY

## 2018-04-23 PROCEDURE — 99024 POSTOP FOLLOW-UP VISIT: CPT | Mod: ,,, | Performed by: OBSTETRICS & GYNECOLOGY

## 2018-04-23 PROCEDURE — 99999 PR PBB SHADOW E&M-EST. PATIENT-LVL III: CPT | Mod: PBBFAC,,, | Performed by: OBSTETRICS & GYNECOLOGY

## 2018-04-23 NOTE — PROGRESS NOTES
"CC: INCISION CHECK    HPI:  Shaye Bermudez is a 27 y.o. female  who presents for an incision check.  She is 2 weeks S/P a repeat  with BTL on 2018.  She and the baby are doing well.  Only mild incisional discomfort with certain movements.  No fever.   No bowel / bladder complaints.  She is breast feeding.    Pathology 18:  SPECIMEN  1) Left fallopian tube  2) Right fallopian tube  FINAL PATHOLOGIC DIAGNOSIS  1. FALLOPIAN TUBE: THE LUMEN IS REPRESENTED, SURROUNDED BY COMPLETE MUCOSAL AND  MUSCULAR WALLS.  2. FALLOPIAN TUBE: THE LUMEN IS REPRESENTED, SURROUNDED BY COMPLETE MUCOSAL AND  MUSCULAR WALLS.      Delivery Date: 2018  Delivery MD: Dr. Thornton    Pregnancy was complicated by:  Previous C/S x 2  Pyelonephritis at 16 weeks  Tobacco usage      /64   Ht 5' 5" (1.651 m)   Wt 85 kg (187 lb 6.3 oz)   LMP 2017 (LMP Unknown)   BMI 31.18 kg/m²     PHYSICAL EXAM:  ABDOMEN:  Soft, non-tender, non-distended  INCISION: Healing well, intact, no drainage, no erythema, no sign of infection.  Stitch trimmed.    IMP:  Doing well 2 weeks S/P C/S, BTL  Instructions / precautions reviewed    PLAN:  Return: 4 weeks for PP check    "

## 2018-04-25 ENCOUNTER — PATIENT MESSAGE (OUTPATIENT)
Dept: OBSTETRICS AND GYNECOLOGY | Facility: CLINIC | Age: 28
End: 2018-04-25

## 2018-05-21 ENCOUNTER — PATIENT MESSAGE (OUTPATIENT)
Dept: OBSTETRICS AND GYNECOLOGY | Facility: CLINIC | Age: 28
End: 2018-05-21

## 2018-05-24 ENCOUNTER — POSTPARTUM VISIT (OUTPATIENT)
Dept: OBSTETRICS AND GYNECOLOGY | Facility: CLINIC | Age: 28
End: 2018-05-24
Attending: OBSTETRICS & GYNECOLOGY
Payer: MEDICAID

## 2018-05-24 VITALS
DIASTOLIC BLOOD PRESSURE: 82 MMHG | HEIGHT: 66 IN | WEIGHT: 191.13 LBS | BODY MASS INDEX: 30.72 KG/M2 | SYSTOLIC BLOOD PRESSURE: 110 MMHG

## 2018-05-24 PROCEDURE — 99999 PR PBB SHADOW E&M-EST. PATIENT-LVL III: CPT | Mod: PBBFAC,,, | Performed by: OBSTETRICS & GYNECOLOGY

## 2018-05-24 PROCEDURE — 99213 OFFICE O/P EST LOW 20 MIN: CPT | Mod: PBBFAC | Performed by: OBSTETRICS & GYNECOLOGY

## 2018-05-24 NOTE — PROGRESS NOTES
"CC: Post-partum follow-up    Shaye Bermudez is a 27 y.o. female  who presents for post-partum visit.  She is S/P a repeat C/S with BTL 18.  She and the baby are doing well.  No pain.  No fever.   No bowel / bladder complaints.  Breast feeding.    Pap 10/30/17: Negative    Pathology 18:  SPECIMEN  1) Left fallopian tube  2) Right fallopian tube  FINAL PATHOLOGIC DIAGNOSIS  1. FALLOPIAN TUBE: THE LUMEN IS REPRESENTED, SURROUNDED BY COMPLETE MUCOSAL AND  MUSCULAR WALLS.  2. FALLOPIAN TUBE: THE LUMEN IS REPRESENTED, SURROUNDED BY COMPLETE MUCOSAL AND  MUSCULAR WALLS.      Delivery Date: 2018  Delivery MD: Dr. Thornton  Breast Feeding: YES  Depression: NO  Contraception: bilateral tubal ligation    Pregnancy was complicated by:  Previous C/S x 2  Pyelo at 16 weeks    /82   Ht 5' 6" (1.676 m)   Wt 86.7 kg (191 lb 2.2 oz)   LMP 2017 (LMP Unknown)   Breastfeeding? Yes   BMI 30.85 kg/m²     ROS:  GENERAL: No fever, chills, fatigability.  VULVAR: No pain, no lesions and no itching.  VAGINAL: No relaxation, no itching, no discharge, no abnormal bleeding and no lesions.  ABDOMEN: No abdominal pain. Denies nausea. Denies vomiting. No diarrhea. No constipation  BREAST: Denies pain. No lumps.  URINARY: No incontinence, no nocturia, no frequency and no dysuria.  CARDIOVASCULAR: No chest pain. No shortness of breath. No leg cramps.  NEUROLOGICAL: No headaches. No vision changes.    PHYSICAL EXAM:  Exam chaperoned by nurse  ABDOMEN:  Soft, non-tender, non-distended  INCISION: Well healed, no sign of infection  VULVA:  Normal, no lesions  CERVIX:  Without lesions, polyps or tenderness.  UTERUS:  Normal size, shape, consistency, no mass or tenderness.  ADNEXA:  Normal in size without mass or tenderness    IMP:  Doing well S/P repeat C/S with BTL  Instructions / precautions reviewed  Contraceptive counseling      PLAN:  May resume normal activities  Return for annual exam 10/30/17      "

## 2018-07-04 ENCOUNTER — PATIENT MESSAGE (OUTPATIENT)
Dept: OBSTETRICS AND GYNECOLOGY | Facility: CLINIC | Age: 28
End: 2018-07-04

## 2021-05-27 ENCOUNTER — CLINICAL SUPPORT (OUTPATIENT)
Dept: REHABILITATION | Facility: HOSPITAL | Age: 31
End: 2021-05-27
Payer: MEDICAID

## 2021-05-27 DIAGNOSIS — M25.562 CHRONIC PAIN OF BOTH KNEES: ICD-10-CM

## 2021-05-27 DIAGNOSIS — G89.29 CHRONIC PAIN OF BOTH KNEES: ICD-10-CM

## 2021-05-27 DIAGNOSIS — M25.362 PATELLAR INSTABILITY OF LEFT KNEE: ICD-10-CM

## 2021-05-27 DIAGNOSIS — R29.898 DECREASED STRENGTH OF LOWER EXTREMITY: ICD-10-CM

## 2021-05-27 DIAGNOSIS — M25.561 CHRONIC PAIN OF BOTH KNEES: ICD-10-CM

## 2021-05-27 DIAGNOSIS — M62.89 MUSCLE TIGHTNESS: ICD-10-CM

## 2021-05-27 PROCEDURE — 97161 PT EVAL LOW COMPLEX 20 MIN: CPT | Mod: PN

## 2021-06-03 ENCOUNTER — CLINICAL SUPPORT (OUTPATIENT)
Dept: REHABILITATION | Facility: HOSPITAL | Age: 31
End: 2021-06-03
Payer: MEDICAID

## 2021-06-03 DIAGNOSIS — M62.89 MUSCLE TIGHTNESS: ICD-10-CM

## 2021-06-03 DIAGNOSIS — M25.362 PATELLAR INSTABILITY OF LEFT KNEE: ICD-10-CM

## 2021-06-03 DIAGNOSIS — M25.562 CHRONIC PAIN OF BOTH KNEES: ICD-10-CM

## 2021-06-03 DIAGNOSIS — M25.561 CHRONIC PAIN OF BOTH KNEES: ICD-10-CM

## 2021-06-03 DIAGNOSIS — R29.898 DECREASED STRENGTH OF LOWER EXTREMITY: ICD-10-CM

## 2021-06-03 DIAGNOSIS — G89.29 CHRONIC PAIN OF BOTH KNEES: ICD-10-CM

## 2021-06-03 PROCEDURE — 97110 THERAPEUTIC EXERCISES: CPT | Mod: PN,CQ

## 2021-06-22 ENCOUNTER — CLINICAL SUPPORT (OUTPATIENT)
Dept: REHABILITATION | Facility: HOSPITAL | Age: 31
End: 2021-06-22
Payer: MEDICAID

## 2021-06-22 DIAGNOSIS — M62.89 MUSCLE TIGHTNESS: ICD-10-CM

## 2021-06-22 DIAGNOSIS — M25.362 PATELLAR INSTABILITY OF LEFT KNEE: ICD-10-CM

## 2021-06-22 DIAGNOSIS — M25.562 CHRONIC PAIN OF BOTH KNEES: ICD-10-CM

## 2021-06-22 DIAGNOSIS — G89.29 CHRONIC PAIN OF BOTH KNEES: ICD-10-CM

## 2021-06-22 DIAGNOSIS — M25.561 CHRONIC PAIN OF BOTH KNEES: ICD-10-CM

## 2021-06-22 DIAGNOSIS — R29.898 DECREASED STRENGTH OF LOWER EXTREMITY: ICD-10-CM

## 2021-06-22 PROCEDURE — 97110 THERAPEUTIC EXERCISES: CPT | Mod: PN,CQ

## 2021-06-24 ENCOUNTER — CLINICAL SUPPORT (OUTPATIENT)
Dept: REHABILITATION | Facility: HOSPITAL | Age: 31
End: 2021-06-24
Payer: MEDICAID

## 2021-06-24 DIAGNOSIS — G89.29 CHRONIC PAIN OF BOTH KNEES: ICD-10-CM

## 2021-06-24 DIAGNOSIS — M25.562 CHRONIC PAIN OF BOTH KNEES: ICD-10-CM

## 2021-06-24 DIAGNOSIS — M62.89 MUSCLE TIGHTNESS: ICD-10-CM

## 2021-06-24 DIAGNOSIS — M25.561 CHRONIC PAIN OF BOTH KNEES: ICD-10-CM

## 2021-06-24 DIAGNOSIS — R29.898 DECREASED STRENGTH OF LOWER EXTREMITY: ICD-10-CM

## 2021-06-24 DIAGNOSIS — M25.362 PATELLAR INSTABILITY OF LEFT KNEE: ICD-10-CM

## 2021-06-24 PROCEDURE — 97110 THERAPEUTIC EXERCISES: CPT | Mod: PN

## 2021-06-28 ENCOUNTER — CLINICAL SUPPORT (OUTPATIENT)
Dept: REHABILITATION | Facility: HOSPITAL | Age: 31
End: 2021-06-28
Payer: MEDICAID

## 2021-06-28 DIAGNOSIS — M25.561 CHRONIC PAIN OF BOTH KNEES: ICD-10-CM

## 2021-06-28 DIAGNOSIS — R29.898 DECREASED STRENGTH OF LOWER EXTREMITY: ICD-10-CM

## 2021-06-28 DIAGNOSIS — G89.29 CHRONIC PAIN OF BOTH KNEES: ICD-10-CM

## 2021-06-28 DIAGNOSIS — M62.89 MUSCLE TIGHTNESS: ICD-10-CM

## 2021-06-28 DIAGNOSIS — M25.362 PATELLAR INSTABILITY OF LEFT KNEE: ICD-10-CM

## 2021-06-28 DIAGNOSIS — M25.562 CHRONIC PAIN OF BOTH KNEES: ICD-10-CM

## 2021-06-28 PROCEDURE — 97110 THERAPEUTIC EXERCISES: CPT | Mod: PN

## 2021-06-30 ENCOUNTER — CLINICAL SUPPORT (OUTPATIENT)
Dept: REHABILITATION | Facility: HOSPITAL | Age: 31
End: 2021-06-30
Payer: MEDICAID

## 2021-06-30 DIAGNOSIS — M62.89 MUSCLE TIGHTNESS: ICD-10-CM

## 2021-06-30 DIAGNOSIS — M25.561 CHRONIC PAIN OF BOTH KNEES: ICD-10-CM

## 2021-06-30 DIAGNOSIS — M25.562 CHRONIC PAIN OF BOTH KNEES: ICD-10-CM

## 2021-06-30 DIAGNOSIS — M25.362 PATELLAR INSTABILITY OF LEFT KNEE: ICD-10-CM

## 2021-06-30 DIAGNOSIS — G89.29 CHRONIC PAIN OF BOTH KNEES: ICD-10-CM

## 2021-06-30 DIAGNOSIS — R29.898 DECREASED STRENGTH OF LOWER EXTREMITY: ICD-10-CM

## 2021-06-30 PROCEDURE — 97110 THERAPEUTIC EXERCISES: CPT | Mod: PN,CQ

## 2021-07-06 ENCOUNTER — CLINICAL SUPPORT (OUTPATIENT)
Dept: REHABILITATION | Facility: HOSPITAL | Age: 31
End: 2021-07-06
Payer: MEDICAID

## 2021-07-06 DIAGNOSIS — M62.89 MUSCLE TIGHTNESS: ICD-10-CM

## 2021-07-06 DIAGNOSIS — M25.362 PATELLAR INSTABILITY OF LEFT KNEE: ICD-10-CM

## 2021-07-06 DIAGNOSIS — M25.562 CHRONIC PAIN OF BOTH KNEES: ICD-10-CM

## 2021-07-06 DIAGNOSIS — G89.29 CHRONIC PAIN OF BOTH KNEES: ICD-10-CM

## 2021-07-06 DIAGNOSIS — M25.561 CHRONIC PAIN OF BOTH KNEES: ICD-10-CM

## 2021-07-06 DIAGNOSIS — R29.898 DECREASED STRENGTH OF LOWER EXTREMITY: ICD-10-CM

## 2021-07-06 PROCEDURE — 97110 THERAPEUTIC EXERCISES: CPT | Mod: PN

## 2021-07-08 ENCOUNTER — CLINICAL SUPPORT (OUTPATIENT)
Dept: REHABILITATION | Facility: HOSPITAL | Age: 31
End: 2021-07-08
Payer: MEDICAID

## 2021-07-08 DIAGNOSIS — M25.562 CHRONIC PAIN OF BOTH KNEES: ICD-10-CM

## 2021-07-08 DIAGNOSIS — M62.89 MUSCLE TIGHTNESS: ICD-10-CM

## 2021-07-08 DIAGNOSIS — M25.362 PATELLAR INSTABILITY OF LEFT KNEE: ICD-10-CM

## 2021-07-08 DIAGNOSIS — G89.29 CHRONIC PAIN OF BOTH KNEES: ICD-10-CM

## 2021-07-08 DIAGNOSIS — M25.561 CHRONIC PAIN OF BOTH KNEES: ICD-10-CM

## 2021-07-08 DIAGNOSIS — R29.898 DECREASED STRENGTH OF LOWER EXTREMITY: ICD-10-CM

## 2021-07-08 PROCEDURE — 97110 THERAPEUTIC EXERCISES: CPT | Mod: PN

## 2021-07-15 ENCOUNTER — CLINICAL SUPPORT (OUTPATIENT)
Dept: REHABILITATION | Facility: HOSPITAL | Age: 31
End: 2021-07-15
Payer: MEDICAID

## 2021-07-15 DIAGNOSIS — M25.561 CHRONIC PAIN OF BOTH KNEES: ICD-10-CM

## 2021-07-15 DIAGNOSIS — G89.29 CHRONIC PAIN OF BOTH KNEES: ICD-10-CM

## 2021-07-15 DIAGNOSIS — M25.362 PATELLAR INSTABILITY OF LEFT KNEE: ICD-10-CM

## 2021-07-15 DIAGNOSIS — M25.562 CHRONIC PAIN OF BOTH KNEES: ICD-10-CM

## 2021-07-15 DIAGNOSIS — M62.89 MUSCLE TIGHTNESS: ICD-10-CM

## 2021-07-15 DIAGNOSIS — R29.898 DECREASED STRENGTH OF LOWER EXTREMITY: ICD-10-CM

## 2021-07-15 PROCEDURE — 97110 THERAPEUTIC EXERCISES: CPT | Mod: PN

## 2021-09-17 ENCOUNTER — CLINICAL SUPPORT (OUTPATIENT)
Dept: REHABILITATION | Facility: HOSPITAL | Age: 31
End: 2021-09-17
Payer: MEDICAID

## 2021-09-17 DIAGNOSIS — Z47.89 ENCOUNTER FOR OTHER ORTHOPEDIC AFTERCARE: ICD-10-CM

## 2021-09-17 DIAGNOSIS — R29.898 DECREASED STRENGTH INVOLVING KNEE JOINT: ICD-10-CM

## 2021-09-17 DIAGNOSIS — R26.2 DIFFICULTY WALKING: ICD-10-CM

## 2021-09-17 DIAGNOSIS — M25.662 DECREASED ROM OF LEFT KNEE: ICD-10-CM

## 2021-09-17 PROCEDURE — 97161 PT EVAL LOW COMPLEX 20 MIN: CPT | Mod: PN

## 2021-09-17 PROCEDURE — 97110 THERAPEUTIC EXERCISES: CPT | Mod: PN

## 2021-09-20 ENCOUNTER — CLINICAL SUPPORT (OUTPATIENT)
Dept: REHABILITATION | Facility: HOSPITAL | Age: 31
End: 2021-09-20
Payer: MEDICAID

## 2021-09-20 DIAGNOSIS — Z47.89 ENCOUNTER FOR OTHER ORTHOPEDIC AFTERCARE: ICD-10-CM

## 2021-09-20 DIAGNOSIS — R26.2 DIFFICULTY WALKING: ICD-10-CM

## 2021-09-20 DIAGNOSIS — M25.662 DECREASED ROM OF LEFT KNEE: ICD-10-CM

## 2021-09-20 DIAGNOSIS — R29.898 DECREASED STRENGTH INVOLVING KNEE JOINT: ICD-10-CM

## 2021-09-20 PROCEDURE — 97110 THERAPEUTIC EXERCISES: CPT | Mod: PN

## 2021-09-20 PROCEDURE — 97140 MANUAL THERAPY 1/> REGIONS: CPT | Mod: PN

## 2021-09-23 ENCOUNTER — CLINICAL SUPPORT (OUTPATIENT)
Dept: REHABILITATION | Facility: HOSPITAL | Age: 31
End: 2021-09-23
Payer: MEDICAID

## 2021-09-23 DIAGNOSIS — Z47.89 ENCOUNTER FOR OTHER ORTHOPEDIC AFTERCARE: ICD-10-CM

## 2021-09-23 DIAGNOSIS — R26.2 DIFFICULTY WALKING: ICD-10-CM

## 2021-09-23 DIAGNOSIS — M25.662 DECREASED ROM OF LEFT KNEE: ICD-10-CM

## 2021-09-23 DIAGNOSIS — R29.898 DECREASED STRENGTH INVOLVING KNEE JOINT: ICD-10-CM

## 2021-09-23 PROCEDURE — 97110 THERAPEUTIC EXERCISES: CPT | Mod: PN

## 2021-09-29 ENCOUNTER — CLINICAL SUPPORT (OUTPATIENT)
Dept: REHABILITATION | Facility: HOSPITAL | Age: 31
End: 2021-09-29
Payer: MEDICAID

## 2021-09-29 DIAGNOSIS — M25.662 DECREASED ROM OF LEFT KNEE: ICD-10-CM

## 2021-09-29 DIAGNOSIS — R29.898 DECREASED STRENGTH INVOLVING KNEE JOINT: ICD-10-CM

## 2021-09-29 DIAGNOSIS — Z47.89 ENCOUNTER FOR OTHER ORTHOPEDIC AFTERCARE: ICD-10-CM

## 2021-09-29 DIAGNOSIS — R26.2 DIFFICULTY WALKING: ICD-10-CM

## 2021-09-29 PROCEDURE — 97110 THERAPEUTIC EXERCISES: CPT | Mod: PN

## 2021-10-04 ENCOUNTER — CLINICAL SUPPORT (OUTPATIENT)
Dept: REHABILITATION | Facility: HOSPITAL | Age: 31
End: 2021-10-04
Payer: MEDICAID

## 2021-10-04 DIAGNOSIS — Z47.89 ENCOUNTER FOR OTHER ORTHOPEDIC AFTERCARE: ICD-10-CM

## 2021-10-04 DIAGNOSIS — R26.2 DIFFICULTY WALKING: ICD-10-CM

## 2021-10-04 DIAGNOSIS — M25.662 DECREASED ROM OF LEFT KNEE: ICD-10-CM

## 2021-10-04 DIAGNOSIS — R29.898 DECREASED STRENGTH INVOLVING KNEE JOINT: ICD-10-CM

## 2021-10-04 PROCEDURE — 97110 THERAPEUTIC EXERCISES: CPT | Mod: PN

## 2021-10-06 ENCOUNTER — CLINICAL SUPPORT (OUTPATIENT)
Dept: REHABILITATION | Facility: HOSPITAL | Age: 31
End: 2021-10-06
Payer: MEDICAID

## 2021-10-06 DIAGNOSIS — R26.2 DIFFICULTY WALKING: ICD-10-CM

## 2021-10-06 DIAGNOSIS — Z47.89 ENCOUNTER FOR OTHER ORTHOPEDIC AFTERCARE: ICD-10-CM

## 2021-10-06 DIAGNOSIS — R29.898 DECREASED STRENGTH INVOLVING KNEE JOINT: ICD-10-CM

## 2021-10-06 DIAGNOSIS — M25.662 DECREASED ROM OF LEFT KNEE: ICD-10-CM

## 2021-10-06 PROCEDURE — 97110 THERAPEUTIC EXERCISES: CPT | Mod: PN

## 2021-10-11 ENCOUNTER — CLINICAL SUPPORT (OUTPATIENT)
Dept: REHABILITATION | Facility: HOSPITAL | Age: 31
End: 2021-10-11
Payer: MEDICAID

## 2021-10-11 DIAGNOSIS — M25.662 DECREASED ROM OF LEFT KNEE: ICD-10-CM

## 2021-10-11 DIAGNOSIS — R29.898 DECREASED STRENGTH INVOLVING KNEE JOINT: ICD-10-CM

## 2021-10-11 DIAGNOSIS — Z47.89 ENCOUNTER FOR OTHER ORTHOPEDIC AFTERCARE: ICD-10-CM

## 2021-10-11 DIAGNOSIS — R26.2 DIFFICULTY WALKING: ICD-10-CM

## 2021-10-11 PROCEDURE — 97110 THERAPEUTIC EXERCISES: CPT | Mod: PN

## 2021-10-13 ENCOUNTER — CLINICAL SUPPORT (OUTPATIENT)
Dept: REHABILITATION | Facility: HOSPITAL | Age: 31
End: 2021-10-13
Payer: MEDICAID

## 2021-10-13 DIAGNOSIS — R26.2 DIFFICULTY WALKING: ICD-10-CM

## 2021-10-13 DIAGNOSIS — R29.898 DECREASED STRENGTH INVOLVING KNEE JOINT: ICD-10-CM

## 2021-10-13 DIAGNOSIS — Z47.89 ENCOUNTER FOR OTHER ORTHOPEDIC AFTERCARE: ICD-10-CM

## 2021-10-13 DIAGNOSIS — M25.662 DECREASED ROM OF LEFT KNEE: ICD-10-CM

## 2021-10-13 PROCEDURE — 97110 THERAPEUTIC EXERCISES: CPT | Mod: PN

## 2021-10-20 ENCOUNTER — CLINICAL SUPPORT (OUTPATIENT)
Dept: REHABILITATION | Facility: HOSPITAL | Age: 31
End: 2021-10-20
Payer: MEDICAID

## 2021-10-20 DIAGNOSIS — R29.898 DECREASED STRENGTH INVOLVING KNEE JOINT: ICD-10-CM

## 2021-10-20 DIAGNOSIS — M25.662 DECREASED ROM OF LEFT KNEE: ICD-10-CM

## 2021-10-20 DIAGNOSIS — Z47.89 ENCOUNTER FOR OTHER ORTHOPEDIC AFTERCARE: ICD-10-CM

## 2021-10-20 DIAGNOSIS — R26.2 DIFFICULTY WALKING: ICD-10-CM

## 2021-10-20 PROCEDURE — 97110 THERAPEUTIC EXERCISES: CPT | Mod: PN

## 2021-11-03 ENCOUNTER — CLINICAL SUPPORT (OUTPATIENT)
Dept: REHABILITATION | Facility: HOSPITAL | Age: 31
End: 2021-11-03
Payer: MEDICAID

## 2021-11-03 DIAGNOSIS — Z47.89 ENCOUNTER FOR OTHER ORTHOPEDIC AFTERCARE: ICD-10-CM

## 2021-11-03 DIAGNOSIS — M25.662 DECREASED ROM OF LEFT KNEE: ICD-10-CM

## 2021-11-03 DIAGNOSIS — R26.2 DIFFICULTY WALKING: ICD-10-CM

## 2021-11-03 DIAGNOSIS — R29.898 DECREASED STRENGTH INVOLVING KNEE JOINT: ICD-10-CM

## 2021-11-03 PROCEDURE — 97110 THERAPEUTIC EXERCISES: CPT | Mod: PN

## 2021-11-05 ENCOUNTER — CLINICAL SUPPORT (OUTPATIENT)
Dept: REHABILITATION | Facility: HOSPITAL | Age: 31
End: 2021-11-05
Payer: MEDICAID

## 2021-11-05 DIAGNOSIS — R26.2 DIFFICULTY WALKING: ICD-10-CM

## 2021-11-05 DIAGNOSIS — M25.662 DECREASED ROM OF LEFT KNEE: ICD-10-CM

## 2021-11-05 DIAGNOSIS — Z47.89 ENCOUNTER FOR OTHER ORTHOPEDIC AFTERCARE: ICD-10-CM

## 2021-11-05 DIAGNOSIS — R29.898 DECREASED STRENGTH INVOLVING KNEE JOINT: ICD-10-CM

## 2021-11-05 PROCEDURE — 97110 THERAPEUTIC EXERCISES: CPT | Mod: PN

## 2021-11-09 ENCOUNTER — CLINICAL SUPPORT (OUTPATIENT)
Dept: REHABILITATION | Facility: HOSPITAL | Age: 31
End: 2021-11-09
Payer: MEDICAID

## 2021-11-09 DIAGNOSIS — M25.662 DECREASED ROM OF LEFT KNEE: ICD-10-CM

## 2021-11-09 DIAGNOSIS — R29.898 DECREASED STRENGTH INVOLVING KNEE JOINT: ICD-10-CM

## 2021-11-09 DIAGNOSIS — R26.2 DIFFICULTY WALKING: ICD-10-CM

## 2021-11-09 DIAGNOSIS — Z47.89 ENCOUNTER FOR OTHER ORTHOPEDIC AFTERCARE: ICD-10-CM

## 2021-11-09 PROCEDURE — 97110 THERAPEUTIC EXERCISES: CPT | Mod: PN

## 2021-11-16 ENCOUNTER — CLINICAL SUPPORT (OUTPATIENT)
Dept: REHABILITATION | Facility: HOSPITAL | Age: 31
End: 2021-11-16
Payer: MEDICAID

## 2021-11-16 DIAGNOSIS — R29.898 DECREASED STRENGTH INVOLVING KNEE JOINT: ICD-10-CM

## 2021-11-16 DIAGNOSIS — Z47.89 ENCOUNTER FOR OTHER ORTHOPEDIC AFTERCARE: ICD-10-CM

## 2021-11-16 DIAGNOSIS — R26.2 DIFFICULTY WALKING: ICD-10-CM

## 2021-11-16 DIAGNOSIS — M25.662 DECREASED ROM OF LEFT KNEE: ICD-10-CM

## 2021-11-16 PROCEDURE — 97110 THERAPEUTIC EXERCISES: CPT | Mod: PN

## 2023-09-07 ENCOUNTER — CLINICAL SUPPORT (OUTPATIENT)
Dept: OBSTETRICS AND GYNECOLOGY | Facility: CLINIC | Age: 33
End: 2023-09-07
Payer: MEDICAID

## 2023-09-07 ENCOUNTER — OFFICE VISIT (OUTPATIENT)
Dept: OBSTETRICS AND GYNECOLOGY | Facility: CLINIC | Age: 33
End: 2023-09-07
Payer: MEDICAID

## 2023-09-07 VITALS
SYSTOLIC BLOOD PRESSURE: 122 MMHG | HEIGHT: 66 IN | WEIGHT: 211.63 LBS | BODY MASS INDEX: 34.01 KG/M2 | DIASTOLIC BLOOD PRESSURE: 80 MMHG

## 2023-09-07 DIAGNOSIS — Z01.419 WELL WOMAN EXAM WITH ROUTINE GYNECOLOGICAL EXAM: Primary | ICD-10-CM

## 2023-09-07 DIAGNOSIS — Z71.85 HPV VACCINE COUNSELING: ICD-10-CM

## 2023-09-07 DIAGNOSIS — Z23 NEED FOR HPV VACCINE: Primary | ICD-10-CM

## 2023-09-07 PROBLEM — O23.00 PYELONEPHRITIS AFFECTING PREGNANCY: Status: RESOLVED | Noted: 2017-10-30 | Resolved: 2023-09-07

## 2023-09-07 PROBLEM — Z47.89 ENCOUNTER FOR OTHER ORTHOPEDIC AFTERCARE: Status: RESOLVED | Noted: 2021-09-17 | Resolved: 2023-09-07

## 2023-09-07 PROBLEM — M25.562 CHRONIC PAIN OF BOTH KNEES: Status: RESOLVED | Noted: 2021-05-27 | Resolved: 2023-09-07

## 2023-09-07 PROBLEM — M62.89 MUSCLE TIGHTNESS: Status: RESOLVED | Noted: 2021-05-27 | Resolved: 2023-09-07

## 2023-09-07 PROBLEM — G89.29 CHRONIC PAIN OF BOTH KNEES: Status: RESOLVED | Noted: 2021-05-27 | Resolved: 2023-09-07

## 2023-09-07 PROBLEM — R29.898 DECREASED STRENGTH OF LOWER EXTREMITY: Status: RESOLVED | Noted: 2021-05-27 | Resolved: 2023-09-07

## 2023-09-07 PROBLEM — R29.898 DECREASED STRENGTH INVOLVING KNEE JOINT: Status: RESOLVED | Noted: 2021-09-17 | Resolved: 2023-09-07

## 2023-09-07 PROBLEM — M25.662 DECREASED ROM OF LEFT KNEE: Status: RESOLVED | Noted: 2021-09-17 | Resolved: 2023-09-07

## 2023-09-07 PROBLEM — M25.362 PATELLAR INSTABILITY OF LEFT KNEE: Status: RESOLVED | Noted: 2021-05-27 | Resolved: 2023-09-07

## 2023-09-07 PROBLEM — M25.561 CHRONIC PAIN OF BOTH KNEES: Status: RESOLVED | Noted: 2021-05-27 | Resolved: 2023-09-07

## 2023-09-07 PROBLEM — Z98.891 H/O CESAREAN SECTION: Status: RESOLVED | Noted: 2018-04-06 | Resolved: 2023-09-07

## 2023-09-07 PROBLEM — R26.2 DIFFICULTY WALKING: Status: RESOLVED | Noted: 2021-09-17 | Resolved: 2023-09-07

## 2023-09-07 PROCEDURE — 90471 IMMUNIZATION ADMIN: CPT | Mod: PBBFAC

## 2023-09-07 PROCEDURE — 99385 PREV VISIT NEW AGE 18-39: CPT | Mod: S$PBB,,, | Performed by: OBSTETRICS & GYNECOLOGY

## 2023-09-07 PROCEDURE — 90651 9VHPV VACCINE 2/3 DOSE IM: CPT | Mod: PBBFAC

## 2023-09-07 PROCEDURE — 3079F DIAST BP 80-89 MM HG: CPT | Mod: CPTII,,, | Performed by: OBSTETRICS & GYNECOLOGY

## 2023-09-07 PROCEDURE — 3008F BODY MASS INDEX DOCD: CPT | Mod: CPTII,,, | Performed by: OBSTETRICS & GYNECOLOGY

## 2023-09-07 PROCEDURE — 3079F PR MOST RECENT DIASTOLIC BLOOD PRESSURE 80-89 MM HG: ICD-10-PCS | Mod: CPTII,,, | Performed by: OBSTETRICS & GYNECOLOGY

## 2023-09-07 PROCEDURE — 99999 PR PBB SHADOW E&M-EST. PATIENT-LVL III: ICD-10-PCS | Mod: PBBFAC,,, | Performed by: OBSTETRICS & GYNECOLOGY

## 2023-09-07 PROCEDURE — 99213 OFFICE O/P EST LOW 20 MIN: CPT | Mod: PBBFAC | Performed by: OBSTETRICS & GYNECOLOGY

## 2023-09-07 PROCEDURE — 88175 CYTOPATH C/V AUTO FLUID REDO: CPT | Performed by: OBSTETRICS & GYNECOLOGY

## 2023-09-07 PROCEDURE — 1159F PR MEDICATION LIST DOCUMENTED IN MEDICAL RECORD: ICD-10-PCS | Mod: CPTII,,, | Performed by: OBSTETRICS & GYNECOLOGY

## 2023-09-07 PROCEDURE — 99999PBSHW HPV VACCINE 9-VALENT 3 DOSE IM: Mod: PBBFAC,,,

## 2023-09-07 PROCEDURE — 1160F RVW MEDS BY RX/DR IN RCRD: CPT | Mod: CPTII,,, | Performed by: OBSTETRICS & GYNECOLOGY

## 2023-09-07 PROCEDURE — 1159F MED LIST DOCD IN RCRD: CPT | Mod: CPTII,,, | Performed by: OBSTETRICS & GYNECOLOGY

## 2023-09-07 PROCEDURE — 1160F PR REVIEW ALL MEDS BY PRESCRIBER/CLIN PHARMACIST DOCUMENTED: ICD-10-PCS | Mod: CPTII,,, | Performed by: OBSTETRICS & GYNECOLOGY

## 2023-09-07 PROCEDURE — 99999 PR PBB SHADOW E&M-EST. PATIENT-LVL III: CPT | Mod: PBBFAC,,, | Performed by: OBSTETRICS & GYNECOLOGY

## 2023-09-07 PROCEDURE — 99385 PR PREVENTIVE VISIT,NEW,18-39: ICD-10-PCS | Mod: S$PBB,,, | Performed by: OBSTETRICS & GYNECOLOGY

## 2023-09-07 PROCEDURE — 3074F SYST BP LT 130 MM HG: CPT | Mod: CPTII,,, | Performed by: OBSTETRICS & GYNECOLOGY

## 2023-09-07 PROCEDURE — 3008F PR BODY MASS INDEX (BMI) DOCUMENTED: ICD-10-PCS | Mod: CPTII,,, | Performed by: OBSTETRICS & GYNECOLOGY

## 2023-09-07 PROCEDURE — 3074F PR MOST RECENT SYSTOLIC BLOOD PRESSURE < 130 MM HG: ICD-10-PCS | Mod: CPTII,,, | Performed by: OBSTETRICS & GYNECOLOGY

## 2023-09-07 PROCEDURE — 87624 HPV HI-RISK TYP POOLED RSLT: CPT | Performed by: OBSTETRICS & GYNECOLOGY

## 2023-09-07 PROCEDURE — 99999PBSHW HPV VACCINE 9-VALENT 3 DOSE IM: ICD-10-PCS | Mod: PBBFAC,,,

## 2023-09-07 RX ORDER — BUPROPION HYDROCHLORIDE 150 MG/1
300 TABLET ORAL EVERY MORNING
COMMUNITY
Start: 2023-08-23

## 2023-09-07 RX ORDER — DEXTROAMPHETAMINE SACCHARATE, AMPHETAMINE ASPARTATE, DEXTROAMPHETAMINE SULFATE AND AMPHETAMINE SULFATE 5; 5; 5; 5 MG/1; MG/1; MG/1; MG/1
1 TABLET ORAL 2 TIMES DAILY
COMMUNITY
Start: 2023-08-25

## 2023-09-07 NOTE — PROGRESS NOTES
Subjective:       Patient ID: Shaye Bermudez is a 32 y.o. female.    Chief Complaint:  Well Woman (Last normal pap was 10/30/2017. Pt had a tubal ligation in 2018)        History of Present Illness  HPI  Annual Exam-Premenopausal  Patient presents for annual exam. The patient has no complaints today. The patient is sexually active. GYN screening history:  Last normal pap was 10/30/2017 . The patient wears seatbelts: yes. The patient participates in regular exercise: yes. Has the patient ever been transfused or tattooed?:  no/yes . The patient reports that there is not domestic violence in her life.    Status post  sections x 3 with tubal ligation      GYN & OB History  Patient's last menstrual period was 2023 (exact date).   Date of Last Pap: 2023    OB History    Para Term  AB Living   6 3 3   3 3   SAB IAB Ectopic Multiple Live Births   3     0 3      # Outcome Date GA Lbr Colby/2nd Weight Sex Delivery Anes PTL Lv   6 Term 18 39w1d  3.23 kg (7 lb 1.9 oz) F CS-LTranv Spinal, EPI  KAILEE   5 Term 10/24/14 39w1d  3.35 kg (7 lb 6.2 oz) F CS-LTranv Spinal N KAILEE   4 SAB 2013 6w0d          3 SAB 2008 8w0d             Birth Comments: System Generated. Please review and update pregnancy details.   2 SAB 2008 7w0d             Birth Comments: System Generated. Please review and update pregnancy details.   1 Term 07 38w0d 36:00 3.515 kg (7 lb 12 oz) M CS-LTranv EPI N KAILEE      Birth Comments: Arrest of dilation at 8 cm,  pyelo at 36 weeks       Past Medical History:   Diagnosis Date    Abnormal Pap smear of cervix        Past Surgical History:   Procedure Laterality Date     SECTION      CHOLECYSTECTOMY         Family History   Problem Relation Age of Onset    Stroke Paternal Grandfather     Heart attack Paternal Grandfather     Breast cancer Paternal Grandmother 42    Dementia Paternal Grandmother     Diabetes Maternal Grandmother     Cancer Father 61         lung cancer.  smoker    Polycystic ovary syndrome Sister     Breast cancer Paternal Aunt 50    Hypertension Neg Hx     Colon cancer Neg Hx     Ovarian cancer Neg Hx        Social History     Socioeconomic History    Marital status: Other   Occupational History     Employer: [employer]   Tobacco Use    Smoking status: Former     Current packs/day: 0.00     Types: Cigarettes     Quit date: 2020     Years since quittin.6    Smokeless tobacco: Never   Substance and Sexual Activity    Alcohol use: No     Comment: rarely    Drug use: No    Sexual activity: Yes     Partners: Male     Birth control/protection: See Surgical Hx   Social History Narrative     since     He works for [employer]. 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      dextroamphetamine-amphetamine (ADDERALL) 20 mg tablet Take 1 tablet by mouth 2 (two) times daily.       No current facility-administered medications for this visit.       Review of patient's allergies indicates:  No Known Allergies     Review of Systems  Review of Systems   Constitutional:  Negative for activity change, appetite change, chills, fatigue, fever and unexpected weight change.   HENT:  Negative for mouth sores.    Respiratory:  Negative for cough, shortness of breath and wheezing.    Cardiovascular:  Negative for chest pain and palpitations.   Gastrointestinal:  Negative for abdominal pain, bloating, blood in stool, constipation, nausea and vomiting.   Endocrine: Negative for diabetes and hot flashes.   Genitourinary:  Negative for dysmenorrhea, dyspareunia, dysuria, frequency, hematuria, menorrhagia, menstrual problem, pelvic pain, urgency, vaginal bleeding, vaginal discharge, vaginal pain, urinary  incontinence, postcoital bleeding and vaginal odor.   Musculoskeletal:  Negative for back pain and myalgias.   Integumentary:  Negative for rash, breast mass and nipple discharge.   Neurological:  Negative for seizures and headaches.   Psychiatric/Behavioral:  Negative for depression and sleep disturbance. The patient is not nervous/anxious.    Breast: Negative for mass, mastodynia and nipple discharge          Objective:    Physical Exam:   Constitutional: She appears well-developed and well-nourished. No distress.   BMI of 34.16    HENT:   Head: Normocephalic and atraumatic.    Eyes: EOM are normal.      Pulmonary/Chest: Effort normal. No respiratory distress.   Breasts: Non-tender, no engorgement, no masses, no retraction, no discharge. Negative for lymphadenopathy.         Abdominal: Soft. She exhibits no distension. There is no abdominal tenderness. There is no rebound and no guarding.   Pfannenstiel scar       Genitourinary:    Vagina and uterus normal.   No  no vaginal discharge in the vagina.    Genitourinary Comments: Vulva without any obvious lesions.  Urethral meatus normal size and location without any lesion.  Urethra is non-tender without stricture or discharge.  Bladder is non-tender.  Vaginal vault with good support.  Minimal white discharge noted.  No obvious lesion.  Normal rugation.  Cervix is without any cervical motion tenderness.  No obvious lesion.  Uterus is small, non-tender, normal contour.  Adnexa is without any masses or tenderness.  Perineum without obvious lesion.               Musculoskeletal: Normal range of motion.       Neurological: She is alert.    Skin: Skin is warm and dry.    Psychiatric: She has a normal mood and affect.          Assessment:        1. Well woman exam with routine gynecological exam    2. HPV vaccine counseling              Plan:          I have discussed with the patient her condition.  Monthly breast examination was instructed, discussed, and encouraged.   Patient was encouraged to consume a low-calorie, low fat diet, and to increase of physical activity.  Healthy habits encouraged.  A Pap smear was performed with HR-HPV according to the USPSTF recommendations.  Mammogram was not ordered because of the combination of her age and risk factors, according to ACOG guidelines.  Gonorrhea and Chlamydia testing not performed;  HIV test not offered, again according to guidelines.  Colonoscopy discussed according to ACS guideline.  We also discussed her obstetric history and CV risks.   Care Gaps addressed.  Patient is to continue her medications as prescribed.  She will come back to see me in one year for her annual visit.  She can come back to see me sooner as necessary.  All of her questions were answered appropriately to her satisfaction.     We discussed Gardasil vaccine.  Risks and benefits discussed  Will get first injection today

## 2023-11-02 ENCOUNTER — CLINICAL SUPPORT (OUTPATIENT)
Dept: OBSTETRICS AND GYNECOLOGY | Facility: CLINIC | Age: 33
End: 2023-11-02
Payer: MEDICAID

## 2023-11-02 DIAGNOSIS — Z23 NEED FOR HPV VACCINE: Primary | ICD-10-CM

## 2023-11-02 PROCEDURE — 90471 IMMUNIZATION ADMIN: CPT | Mod: PBBFAC

## 2023-11-02 PROCEDURE — 99999 PR PBB SHADOW E&M-EST. PATIENT-LVL I: ICD-10-PCS | Mod: PBBFAC,,,

## 2023-11-02 PROCEDURE — 99999PBSHW HPV VACCINE 9-VALENT 3 DOSE IM: ICD-10-PCS | Mod: PBBFAC,,,

## 2023-11-02 PROCEDURE — 99999 PR PBB SHADOW E&M-EST. PATIENT-LVL I: CPT | Mod: PBBFAC,,,

## 2023-11-02 PROCEDURE — 99999PBSHW HPV VACCINE 9-VALENT 3 DOSE IM: Mod: PBBFAC,,,

## 2023-11-02 PROCEDURE — 90651 9VHPV VACCINE 2/3 DOSE IM: CPT | Mod: PBBFAC

## 2023-11-20 DIAGNOSIS — M54.16 LUMBAR BACK PAIN WITH RADICULOPATHY AFFECTING LOWER EXTREMITY: Primary | ICD-10-CM

## 2023-11-20 DIAGNOSIS — M47.817 FACET HYPERTROPHY OF LUMBOSACRAL REGION: ICD-10-CM

## 2023-12-04 ENCOUNTER — CLINICAL SUPPORT (OUTPATIENT)
Dept: REHABILITATION | Facility: HOSPITAL | Age: 33
End: 2023-12-04
Payer: MEDICAID

## 2023-12-04 DIAGNOSIS — M54.50 CHRONIC RIGHT-SIDED LOW BACK PAIN WITHOUT SCIATICA: Primary | ICD-10-CM

## 2023-12-04 DIAGNOSIS — G89.29 CHRONIC RIGHT-SIDED LOW BACK PAIN WITHOUT SCIATICA: Primary | ICD-10-CM

## 2023-12-04 DIAGNOSIS — M47.817 FACET HYPERTROPHY OF LUMBOSACRAL REGION: ICD-10-CM

## 2023-12-04 DIAGNOSIS — M54.16 LUMBAR BACK PAIN WITH RADICULOPATHY AFFECTING LOWER EXTREMITY: ICD-10-CM

## 2023-12-04 PROCEDURE — 97161 PT EVAL LOW COMPLEX 20 MIN: CPT

## 2023-12-04 NOTE — PROGRESS NOTES
[facility]   Physical Therapy Initial Evaluation      Name: Shaye Bermudez  Clinic Number: [id number]    Therapy Diagnosis:   Encounter Diagnoses   Name Primary?    Lumbar back pain with radiculopathy affecting lower extremity     Facet hypertrophy of lumbosacral region     Chronic right-sided low back pain without sciatica Yes        Physician: Order, Paper    Physician Orders: PT Eval and Treat  Medical Diagnosis from Referral: M54.16 (ICD-10-CM) - Lumbar back pain with radiculopathy affecting lower extremity M47.817 (ICD-10-CM) - Facet hypertrophy of lumbosacral region  Evaluation Date: 2023  Authorization Period Expiration: 2023  Plan of Care Expiration: 2024  Progress Note Due: 2024  Visit # / Visits authorized:    FOTO: 1/3    Precautions: Standard     Time In: 1100  Time Out: 1203  Total Billable Time: 63 minutes    Subjective     Date of onset: 10 years ago     History of current condition - Shaye reports: she has been having low back pain for almost a decade now. However, since she got her dog approximately 1.5 years ago, her right sided low back pain has worsened. Her physician initially thought that her hip was the primary  of pain; however, they cleared this with X-rays. She notes that the more she uses her low back, the more it hurts. However, moving too little and remaining immobile will also increase her pain. She is only able to walk for approximately for 15 minutes prior to onset of pain. She has tried heating pad and ice pack, which provides some short-term relief. She reports no radiating pain, increased pain with coughing/sneezing, or numbness/tingling. She has 3 children, who were all delivered via . She notes that physician would like to order MRI imaging.    Falls: none    Imaging:   X-ray lumbar spine: Lumbar spine is in satisfactory alignment. The vertebral bodies are of normal height. The intervertebral disc spaces are  "maintained. There is facet hypertrophy at L5-S1. The paraspinous soft tissues are normal. The SI joints are symmetrical. There are surgical clips right upper quadrant.   X-ray right hip: No acute fracture or malalignment identified. Right hip joint space is preserved. There may be a tiny os acetabuli present. No suspicious bony lesion. No localized soft tissue swelling.  No unintended radiopaque foreign body identified.    Prior Therapy: yes but not for current condition  Social History: Lives with her children  Occupation: In school; drives [employer]  Prior Level of Function: Chronic condition  Current Level of Function: Worsening pain that is limiting her performance of activities of daily living and leisure activities    Pain:  Current 4/10, worst 8/10, best 0/10   Location: right low back   Description: Aching and Sharp  Aggravating Factors: Prolonged standing, walking, squatting, twisting to the right  Easing Factors: Naproxen    Patients goals: strengthening her low back so she can return to walking and recreational exercise     Medical History:   Past Medical History:   Diagnosis Date    Abnormal Pap smear of cervix        Surgical History:   Shaye GARCIA Bermudez  has a past surgical history that includes  section and Cholecystectomy.    Medications:   Shaye has a current medication list which includes the following prescription(s): bupropion and dextroamphetamine-amphetamine.    Allergies:   Review of patient's allergies indicates:  No Known Allergies     Objective      Functional movement:  Overhead DL squat: excessive right hip adduction and internal rotation  SLS Balance EO: left - 19", right - 4"    Gait analysis: no observable gait deficits    Posture: excessive lumbar lordosis    Lumbar:    Active range of motion  Pain/dysfunction with movement:   Flexion 100% no   Extension 60% yes   Left Side Bending 100% no   Right Side Bending 100% yes   Left Rotation 100% no   Right Rotation 100% yes " "    Lower extremity manual muscle tests  Left  Right  Pain/dysfunction with movement   Iliopsoas 4+/5 4-/5*    Hip extension 4+/5 4/5    Glute max 4/5 4/5    Hip abduction 4/5 4/5    Hip external rotation 4+/5 4-/5*      Sensation: Intact    Special Tests:  - Repeated Flexion: -  - Repeated Extension: -  - Prone Instability Test: (+) with decreased pain while palpating L5  - Bridge Test: -    SI Special Tests: Not performed    Joint Mobility: Lumbar WNL    Palpation: Tenderness of right lumbar paraspinals    Flexibility: Not performed      Intake Outcome Measure for FOTO Lumbar Spine Survey    Therapist reviewed FOTO scores for Shaye Bermudez on 12/4/2023.   FOTO report - see Media section or FOTO account episode details.    Intake Score: 47%         Treatment     Total Treatment time (time-based codes) separate from Evaluation: 14 minutes     Shaye received the treatments listed below:      therapeutic exercises to develop strength, endurance, posture, and core stabilization for 14 minutes including:  Sidelying clam: green theraband, 15x ea  Plank: 3x20"  Standing pallof press: BTB, 15x ea    Patient Education and Home Exercises     Education provided:   - Diagnosis and prognosis  - Plan of care  - Home exercise program    Written Home Exercises Provided: yes. Exercises were reviewed and Shaye was able to demonstrate them prior to the end of the session.  Shaye demonstrated good  understanding of the education provided. See EMR under Patient Instructions for exercises provided during therapy sessions.    Assessment     Shaye is a 33 y.o. female referred to outpatient Physical Therapy with a medical diagnosis of M54.16 (ICD-10-CM) - Lumbar back pain with radiculopathy affecting lower extremity and M47.817 (ICD-10-CM) - Facet hypertrophy of lumbosacral region. Patient presents with chronic right sided low back pain, decreased right hip strength, decreased core activation, postural deficits, and positive prone " instability test. These limitations are affecting patient's performance of activities of daily living, recreational exercise, and leisure activities. Patient's symptoms are consistent with lumbar/SIJ instability secondary to decreased hip and core neuromuscular control and weakness. Patient was able to bend thumbs to touch forearms, indicating hypermobility. Full Beighton scale to be assessed at first follow-up.    Patient prognosis is Good.   Patient will benefit from skilled outpatient Physical Therapy to address the deficits stated above and in the chart below, provide patient /family education, and to maximize patientt's level of independence.     Plan of care discussed with patient: Yes  Patient's spiritual, cultural and educational needs considered and patient is agreeable to the plan of care and goals as stated below:     Anticipated Barriers for therapy: chronicity    Medical Necessity is demonstrated by the following  History  Co-morbidities and personal factors that may impact the plan of care [] LOW: no personal factors / co-morbidities  [] MODERATE: 1-2 personal factors / co-morbidities  [x] HIGH: 3+ personal factors / co-morbidities    Moderate / High Support Documentation:   Co-morbidities affecting plan of care: Anxiety or Panic Disorders, Back pain, BMI over 30, Depression, Other disorders, Prior Surgery, Sleep dysfunction    Personal Factors:   no deficits     Examination  Body Structures and Functions, activity limitations and participation restrictions that may impact the plan of care [] LOW: addressing 1-2 elements  [] MODERATE: 3+ elements  [x] HIGH: 4+ elements (please support below)    Moderate / High Support Documentation: gross symmetry, range of motion, strength, gait, posture     Clinical Presentation [x] LOW: stable  [] MODERATE: Evolving  [] HIGH: Unstable     Decision Making/ Complexity Score: low       Goals:  Short Term Goals (6 Weeks):  1. Patient will report decreased low back pain  at worst to </= 5/10 to increase tolerance for activities of daily living.  2. Patient will increase lumbar extension range of motion to 90% norms in order to perform activities of daily living with less difficulty.  3. Patient will improve impaired lower extremity manual muscle tests by 1/3 grade to improve strength for functional tasks.  4. Patient will be compliant with home exercise program to supplement therapy in restoring functional mobility.     Long Term Goals (12 Weeks):   1. Patient will report decreased low back pain at worst to </= 2/10 to increase tolerance for recreational exercise.  2. Patient will improve impaired lower extremity manual muscle tests to >/=4+/5 to improve strength for functional tasks.  3. Patient will improve FOTO score to >/= 56% to decrease perceived limitation with functional mobility.  4. Patient goal: strengthening her low back so she can return to walking and recreational exercise  5. Patient will perform 20# deadlift with good control to demonstrate improved core strength.    Plan     Plan of care Certification: 12/4/2023 to 2/26/2024.    Outpatient Physical Therapy 2 times weekly for 12 weeks to include the following interventions: Electrical Stimulation  , Gait Training, Manual Therapy, Moist Heat/ Ice, Neuromuscular Re-ed, Patient Education, Self Care, Therapeutic Activities, and Therapeutic Exercise.     Luis Tran, PT, DPT        Physician's Signature: _________________________________________ Date: ________________

## 2023-12-05 NOTE — PLAN OF CARE
[facility]   Physical Therapy Initial Evaluation      Name: Shaye Bermudez  Clinic Number: [id number]    Therapy Diagnosis:   Encounter Diagnoses   Name Primary?    Lumbar back pain with radiculopathy affecting lower extremity     Facet hypertrophy of lumbosacral region     Chronic right-sided low back pain without sciatica Yes        Physician: Order, Paper    Physician Orders: PT Eval and Treat  Medical Diagnosis from Referral: M54.16 (ICD-10-CM) - Lumbar back pain with radiculopathy affecting lower extremity M47.817 (ICD-10-CM) - Facet hypertrophy of lumbosacral region  Evaluation Date: 2023  Authorization Period Expiration: 2023  Plan of Care Expiration: 2024  Progress Note Due: 2024  Visit # / Visits authorized:    FOTO: 1/3    Precautions: Standard     Time In: 1100  Time Out: 1203  Total Billable Time: 63 minutes    Subjective     Date of onset: 10 years ago     History of current condition - Shaye reports: she has been having low back pain for almost a decade now. However, since she got her dog approximately 1.5 years ago, her right sided low back pain has worsened. Her physician initially thought that her hip was the primary  of pain; however, they cleared this with X-rays. She notes that the more she uses her low back, the more it hurts. However, moving too little and remaining immobile will also increase her pain. She is only able to walk for approximately for 15 minutes prior to onset of pain. She has tried heating pad and ice pack, which provides some short-term relief. She reports no radiating pain, increased pain with coughing/sneezing, or numbness/tingling. She has 3 children, who were all delivered via . She notes that physician would like to order MRI imaging.    Falls: none    Imaging:   X-ray lumbar spine: Lumbar spine is in satisfactory alignment. The vertebral bodies are of normal height. The intervertebral disc spaces are  "maintained. There is facet hypertrophy at L5-S1. The paraspinous soft tissues are normal. The SI joints are symmetrical. There are surgical clips right upper quadrant.   X-ray right hip: No acute fracture or malalignment identified. Right hip joint space is preserved. There may be a tiny os acetabuli present. No suspicious bony lesion. No localized soft tissue swelling.  No unintended radiopaque foreign body identified.    Prior Therapy: yes but not for current condition  Social History: Lives with her children  Occupation: In school; drives [employer]  Prior Level of Function: Chronic condition  Current Level of Function: Worsening pain that is limiting her performance of activities of daily living and leisure activities    Pain:  Current 4/10, worst 8/10, best 0/10   Location: right low back   Description: Aching and Sharp  Aggravating Factors: Prolonged standing, walking, squatting, twisting to the right  Easing Factors: Naproxen    Patients goals: strengthening her low back so she can return to walking and recreational exercise     Medical History:   Past Medical History:   Diagnosis Date    Abnormal Pap smear of cervix        Surgical History:   Shaye GARCIA Bermudez  has a past surgical history that includes  section and Cholecystectomy.    Medications:   Shaye has a current medication list which includes the following prescription(s): bupropion and dextroamphetamine-amphetamine.    Allergies:   Review of patient's allergies indicates:  No Known Allergies     Objective      Functional movement:  Overhead DL squat: excessive right hip adduction and internal rotation  SLS Balance EO: left - 19", right - 4"    Gait analysis: no observable gait deficits    Posture: excessive lumbar lordosis    Lumbar:    Active range of motion  Pain/dysfunction with movement:   Flexion 100% no   Extension 60% yes   Left Side Bending 100% no   Right Side Bending 100% yes   Left Rotation 100% no   Right Rotation 100% yes " "    Lower extremity manual muscle tests  Left  Right  Pain/dysfunction with movement   Iliopsoas 4+/5 4-/5*    Hip extension 4+/5 4/5    Glute max 4/5 4/5    Hip abduction 4/5 4/5    Hip external rotation 4+/5 4-/5*      Sensation: Intact    Special Tests:  - Repeated Flexion: -  - Repeated Extension: -  - Prone Instability Test: (+) with decreased pain while palpating L5  - Bridge Test: -    SI Special Tests: Not performed    Joint Mobility: Lumbar WNL    Palpation: Tenderness of right lumbar paraspinals    Flexibility: Not performed      Intake Outcome Measure for FOTO Lumbar Spine Survey    Therapist reviewed FOTO scores for Shaye Bermudez on 12/4/2023.   FOTO report - see Media section or FOTO account episode details.    Intake Score: 47%         Treatment     Total Treatment time (time-based codes) separate from Evaluation: 14 minutes     Shaye received the treatments listed below:      therapeutic exercises to develop strength, endurance, posture, and core stabilization for 14 minutes including:  Sidelying clam: green theraband, 15x ea  Plank: 3x20"  Standing pallof press: BTB, 15x ea    Patient Education and Home Exercises     Education provided:   - Diagnosis and prognosis  - Plan of care  - Home exercise program    Written Home Exercises Provided: yes. Exercises were reviewed and Shaye was able to demonstrate them prior to the end of the session.  Shaye demonstrated good  understanding of the education provided. See EMR under Patient Instructions for exercises provided during therapy sessions.    Assessment     Shaye is a 33 y.o. female referred to outpatient Physical Therapy with a medical diagnosis of M54.16 (ICD-10-CM) - Lumbar back pain with radiculopathy affecting lower extremity and M47.817 (ICD-10-CM) - Facet hypertrophy of lumbosacral region. Patient presents with chronic right sided low back pain, decreased right hip strength, decreased core activation, postural deficits, and positive prone " instability test. These limitations are affecting patient's performance of activities of daily living, recreational exercise, and leisure activities. Patient's symptoms are consistent with lumbar/SIJ instability secondary to decreased hip and core neuromuscular control and weakness. Patient was able to bend thumbs to touch forearms, indicating hypermobility. Full Beighton scale to be assessed at first follow-up.    Patient prognosis is Good.   Patient will benefit from skilled outpatient Physical Therapy to address the deficits stated above and in the chart below, provide patient /family education, and to maximize patientt's level of independence.     Plan of care discussed with patient: Yes  Patient's spiritual, cultural and educational needs considered and patient is agreeable to the plan of care and goals as stated below:     Anticipated Barriers for therapy: chronicity    Medical Necessity is demonstrated by the following  History  Co-morbidities and personal factors that may impact the plan of care [] LOW: no personal factors / co-morbidities  [] MODERATE: 1-2 personal factors / co-morbidities  [x] HIGH: 3+ personal factors / co-morbidities    Moderate / High Support Documentation:   Co-morbidities affecting plan of care: Anxiety or Panic Disorders, Back pain, BMI over 30, Depression, Other disorders, Prior Surgery, Sleep dysfunction    Personal Factors:   no deficits     Examination  Body Structures and Functions, activity limitations and participation restrictions that may impact the plan of care [] LOW: addressing 1-2 elements  [] MODERATE: 3+ elements  [x] HIGH: 4+ elements (please support below)    Moderate / High Support Documentation: gross symmetry, range of motion, strength, gait, posture     Clinical Presentation [x] LOW: stable  [] MODERATE: Evolving  [] HIGH: Unstable     Decision Making/ Complexity Score: low       Goals:  Short Term Goals (6 Weeks):  1. Patient will report decreased low back pain  at worst to </= 5/10 to increase tolerance for activities of daily living.  2. Patient will increase lumbar extension range of motion to 90% norms in order to perform activities of daily living with less difficulty.  3. Patient will improve impaired lower extremity manual muscle tests by 1/3 grade to improve strength for functional tasks.  4. Patient will be compliant with home exercise program to supplement therapy in restoring functional mobility.     Long Term Goals (12 Weeks):   1. Patient will report decreased low back pain at worst to </= 2/10 to increase tolerance for recreational exercise.  2. Patient will improve impaired lower extremity manual muscle tests to >/=4+/5 to improve strength for functional tasks.  3. Patient will improve FOTO score to >/= 56% to decrease perceived limitation with functional mobility.  4. Patient goal: strengthening her low back so she can return to walking and recreational exercise  5. Patient will perform 20# deadlift with good control to demonstrate improved core strength.    Plan     Plan of care Certification: 12/4/2023 to 2/26/2024.    Outpatient Physical Therapy 2 times weekly for 12 weeks to include the following interventions: Electrical Stimulation , Gait Training, Manual Therapy, Moist Heat/ Ice, Neuromuscular Re-ed, Patient Education, Self Care, Therapeutic Activities, and Therapeutic Exercise.     Luis Tran, PT, DPT        Physician's Signature: _________________________________________ Date: ________________

## 2023-12-19 ENCOUNTER — CLINICAL SUPPORT (OUTPATIENT)
Dept: REHABILITATION | Facility: HOSPITAL | Age: 33
End: 2023-12-19
Payer: MEDICAID

## 2023-12-19 DIAGNOSIS — G89.29 CHRONIC RIGHT-SIDED LOW BACK PAIN WITHOUT SCIATICA: Primary | ICD-10-CM

## 2023-12-19 DIAGNOSIS — M54.50 CHRONIC RIGHT-SIDED LOW BACK PAIN WITHOUT SCIATICA: Primary | ICD-10-CM

## 2023-12-19 PROCEDURE — 97110 THERAPEUTIC EXERCISES: CPT

## 2023-12-19 PROCEDURE — 97112 NEUROMUSCULAR REEDUCATION: CPT

## 2023-12-19 PROCEDURE — 97140 MANUAL THERAPY 1/> REGIONS: CPT

## 2023-12-19 PROCEDURE — 97530 THERAPEUTIC ACTIVITIES: CPT

## 2023-12-19 NOTE — PROGRESS NOTES
"[facility]   Physical Therapy Treatment Note      Name: Shaye Bermudez  Clinic Number: [id number]    Therapy Diagnosis:   Encounter Diagnosis   Name Primary?    Chronic right-sided low back pain without sciatica Yes     Physician: Order, Paper    Visit Date: 12/19/2023    Physician Orders: PT Eval and Treat  Medical Diagnosis from Referral: M54.16 (ICD-10-CM) - Lumbar back pain with radiculopathy affecting lower extremity M47.817 (ICD-10-CM) - Facet hypertrophy of lumbosacral region  Evaluation Date: 12/4/2023  Authorization Period Expiration: 12/31/2023  Plan of Care Expiration: 2/26/2024  Progress Note Due: 1/2/2024  Visit # / Visits authorized: 1/20 + EVAL  FOTO: 1/3     Precautions: Standard      Time In: 1000  Time Out: 1100  Total Billable Time: 60 minutes    Subjective     Patient reports: she is having increased right low back pain due to doing more in preparation for holidays.    She was compliant with home exercise program.  Response to previous treatment: 1st visit after eval  Functional change: 1st visit after eval    Pain: 6/10  Location: right low back     Objective      Objective Measures updated at progress report unless specified.     Treatment     All units billed as therex per Medicaid guideline    Shaye received the treatments listed below:      therapeutic exercises to develop strength, endurance, posture, and core stabilization for 49 minutes including:  Posterior pelvic tilt: 10x10"  Bridges: 3x10  Modified sideplank clam: green theraband, 3x10 ea  Kneeling med ball press: 8#, 3x12  Standing pallof press: double BTB, 3x8 ea  Hip hinge: 15x, dowel for tactile cue  Deadlift: 5" step, 15# KB, 3x8  Suitcase carry: 26#, 2 laps x 25 feet each hand    manual therapy techniques were applied for 11 minutes, including:  Gr III long axis hip distraction right   Supine thoracic HVLAT    Patient Education and Home Exercises       Education provided:   - Continue prior home " exercise program    Written Home Exercises Provided: Patient instructed to cont prior HEP. Exercises were reviewed and Shaye was able to demonstrate them prior to the end of the session.  Shaye demonstrated good  understanding of the education provided. See Electronic Medical Record under Patient Instructions for exercises provided during therapy sessions    Assessment     Shaye presents to session with increased low back symptomology. This improved following manual therapy. She was progressed with hip strengthening and core stability. Introduced hip hinge mechanics and deadlifts, which she tolerated well with no increase in pain. Patient noted improved symptoms down to 4/10 at end of session.    Shaye Is progressing well towards her goals.   Patient prognosis is Good.     Patient will continue to benefit from skilled outpatient physical therapy to address the deficits listed in the problem list box on initial evaluation, provide pt/family education and to maximize pt's level of independence in the home and community environment.     Patient's spiritual, cultural and educational needs considered and pt agreeable to plan of care and goals.     Anticipated barriers to physical therapy: chronicity    Goals:   Short Term Goals (6 Weeks):  1. Patient will report decreased low back pain at worst to </= 5/10 to increase tolerance for activities of daily living. Progressing, not met  2. Patient will increase lumbar extension range of motion to 90% norms in order to perform activities of daily living with less difficulty. Progressing, not met  3. Patient will improve impaired lower extremity manual muscle tests by 1/3 grade to improve strength for functional tasks. Progressing, not met  4. Patient will be compliant with home exercise program to supplement therapy in restoring functional mobility. Progressing, not met     Long Term Goals (12 Weeks):   1. Patient will report decreased low back pain at worst to </= 2/10 to  increase tolerance for recreational exercise. Progressing, not met  2. Patient will improve impaired lower extremity manual muscle tests to >/=4+/5 to improve strength for functional tasks. Progressing, not met  3. Patient will improve FOTO score to >/= 56% to decrease perceived limitation with functional mobility. Progressing, not met  4. Patient goal: strengthening her low back so she can return to walking and recreational exercise. Progressing, not met  5. Patient will perform 20# deadlift with good control to demonstrate improved core strength. Progressing, not met    Plan     Plan of care Certification: 12/4/2023 to 2/26/2024.    Improve hip strength and core stability    Luis Tran, PT, DPT

## 2023-12-21 ENCOUNTER — CLINICAL SUPPORT (OUTPATIENT)
Dept: REHABILITATION | Facility: HOSPITAL | Age: 33
End: 2023-12-21
Payer: MEDICAID

## 2023-12-21 DIAGNOSIS — M54.50 CHRONIC RIGHT-SIDED LOW BACK PAIN WITHOUT SCIATICA: Primary | ICD-10-CM

## 2023-12-21 DIAGNOSIS — G89.29 CHRONIC RIGHT-SIDED LOW BACK PAIN WITHOUT SCIATICA: Primary | ICD-10-CM

## 2023-12-21 PROCEDURE — 97110 THERAPEUTIC EXERCISES: CPT

## 2023-12-21 NOTE — PROGRESS NOTES
"[facility]   Physical Therapy Treatment Note      Name: Shaye Bermudez  Clinic Number: [id number]    Therapy Diagnosis:   Encounter Diagnosis   Name Primary?    Chronic right-sided low back pain without sciatica Yes     Physician: Order, Paper    Visit Date: 12/21/2023    Physician Orders: PT Eval and Treat  Medical Diagnosis from Referral: M54.16 (ICD-10-CM) - Lumbar back pain with radiculopathy affecting lower extremity M47.817 (ICD-10-CM) - Facet hypertrophy of lumbosacral region  Evaluation Date: 12/4/2023  Authorization Period Expiration: 12/31/2023  Plan of Care Expiration: 2/26/2024  Progress Note Due: 1/2/2024  Visit # / Visits authorized: 2/20 + EVAL  FOTO: 1/3     Precautions: Standard      Time In: 859  Time Out: 1000  Total Billable Time: 61 minutes    Subjective     Patient reports: she is having less low back coming in today. She notes that she helped her neighbor set up outside [email or web address] decorations yesterday, which flared increased symptoms. However, she rested and found relief.    She was compliant with home exercise program.  Response to previous treatment: mild soreness  Functional change: no significant change at this time    Pain: 3/10  Location: right low back     Objective      Objective Measures updated at progress report unless specified.     Treatment     All units billed as therex per Medicaid guideline     Shaye received the treatments listed below:      therapeutic exercises to develop strength, endurance, posture, and core stabilization for 45 minutes including:  Modified sideplank clam: green theraband, 3x10 ea  Kneeling med ball press: 10#, 3x10  Standing pallof press with lift: double GTB, 2x10 ea  Bird dogs: 3x10 with dowel for tactile cueing  Leg press machine: DL, 160#, 3x12  Deadlift: 5" step, 15# KB, 3x8    NP:  Posterior pelvic tilt: 10x10"  Bridges: 3x10  Suitcase carry: 26#, 2 laps x 25 feet each hand  Hip hinge: 15x, dowel for tactile " cue    manual therapy techniques were applied for 16 minutes, including:  Gr III long axis hip distraction right   Gr III L5 gapping  Supine long axis hip HVLAT right  Supine thoracic HVLAT    Patient Education and Home Exercises       Education provided:   - Continue prior home exercise program    Written Home Exercises Provided: Patient instructed to cont prior HEP. Exercises were reviewed and Shaye was able to demonstrate them prior to the end of the session.  Shaye demonstrated good  understanding of the education provided. See Electronic Medical Record under Patient Instructions for exercises provided during therapy sessions    Assessment     Shaye presents to session with decreased low back pain relative to prior visit. She had symptom provocation with modified side plank clams today. Educated patient that this is likely due to right L4 and L5 segment hypermobility with catching/locking. Able to reduce symptoms with manual therapy and core activation interventions to baseline reported pain. Will progress hip strengthening and core stability as tolerated.    Shaye Is progressing well towards her goals.   Patient prognosis is Good.     Patient will continue to benefit from skilled outpatient physical therapy to address the deficits listed in the problem list box on initial evaluation, provide pt/family education and to maximize pt's level of independence in the home and community environment.     Patient's spiritual, cultural and educational needs considered and pt agreeable to plan of care and goals.     Anticipated barriers to physical therapy: chronicity    Goals:   Short Term Goals (6 Weeks):  1. Patient will report decreased low back pain at worst to </= 5/10 to increase tolerance for activities of daily living. Progressing, not met  2. Patient will increase lumbar extension range of motion to 90% norms in order to perform activities of daily living with less difficulty. Progressing, not met  3. Patient  will improve impaired lower extremity manual muscle tests by 1/3 grade to improve strength for functional tasks. Progressing, not met  4. Patient will be compliant with home exercise program to supplement therapy in restoring functional mobility. Progressing, not met     Long Term Goals (12 Weeks):   1. Patient will report decreased low back pain at worst to </= 2/10 to increase tolerance for recreational exercise. Progressing, not met  2. Patient will improve impaired lower extremity manual muscle tests to >/=4+/5 to improve strength for functional tasks. Progressing, not met  3. Patient will improve FOTO score to >/= 56% to decrease perceived limitation with functional mobility. Progressing, not met  4. Patient goal: strengthening her low back so she can return to walking and recreational exercise. Progressing, not met  5. Patient will perform 20# deadlift with good control to demonstrate improved core strength. Progressing, not met    Plan     Plan of care Certification: 12/4/2023 to 2/26/2024.    Improve hip strength and core stability    Luis Tran, PT, DPT

## 2023-12-26 ENCOUNTER — CLINICAL SUPPORT (OUTPATIENT)
Dept: REHABILITATION | Facility: HOSPITAL | Age: 33
End: 2023-12-26
Payer: MEDICAID

## 2023-12-26 DIAGNOSIS — G89.29 CHRONIC RIGHT-SIDED LOW BACK PAIN WITHOUT SCIATICA: Primary | ICD-10-CM

## 2023-12-26 DIAGNOSIS — M54.50 CHRONIC RIGHT-SIDED LOW BACK PAIN WITHOUT SCIATICA: Primary | ICD-10-CM

## 2023-12-26 PROCEDURE — 97110 THERAPEUTIC EXERCISES: CPT

## 2023-12-26 NOTE — PROGRESS NOTES
"[facility]   Physical Therapy Treatment Note      Name: Shaye Bermudez  Clinic Number: [id number]    Therapy Diagnosis:   Encounter Diagnosis   Name Primary?    Chronic right-sided low back pain without sciatica Yes     Physician: Order, Paper    Visit Date: 12/26/2023    Physician Orders: PT Eval and Treat  Medical Diagnosis from Referral: M54.16 (ICD-10-CM) - Lumbar back pain with radiculopathy affecting lower extremity M47.817 (ICD-10-CM) - Facet hypertrophy of lumbosacral region  Evaluation Date: 12/4/2023  Authorization Period Expiration: 12/31/2023  Plan of Care Expiration: 2/26/2024  Progress Note Due: 1/2/2024  Visit # / Visits authorized: 3/20 + EVAL  FOTO: 1/3     Precautions: Standard      Time In: 1101  Time Out: 1200  Total Billable Time: 59 minutes    Subjective     Patient reports: low back is doing well today. Minimal pain at arrival. She did have some pain over holidays but was able to move through the catching sensation.     She was compliant with home exercise program.  Response to previous treatment: mild soreness  Functional change: no significant change at this time    Pain: 3/10  Location: right low back     Objective      Objective Measures updated at progress report unless specified.     Treatment     All units billed as therex per Medicaid guideline     Shaye received the treatments listed below:      therapeutic exercises to develop strength, endurance, posture, and core stabilization for 59 minutes including:  Dead bugs: 3x10  Bird dogs: 3x10 with dowel for tactile cueing  Modified side plank clam: green theraband, 3x10 ea  Suitcase carry: 26#, 2 laps length of turf each hand  SL RDL: 15# KB, 3x8 ea  Leg press machine: DL, 160#, 3x12  Standing pallof press with lift: double GTB, 3x8 ea    NP:  Posterior pelvic tilt: 10x10"  Bridges: 3x10  Kneeling med ball press: 10#, 3x10  Deadlift: 5" step, 15# KB, 3x8    manual therapy techniques were applied for 0 " minutes, including:  Gr III long axis hip distraction right   Gr III L5 gapping  Supine long axis hip HVLAT right  Supine thoracic HVLAT    Patient Education and Home Exercises       Education provided:   - Continue prior home exercise program    Written Home Exercises Provided: Patient instructed to cont prior HEP. Exercises were reviewed and Shaye was able to demonstrate them prior to the end of the session.  Shaye demonstrated good  understanding of the education provided. See Electronic Medical Record under Patient Instructions for exercises provided during therapy sessions    Assessment     Shaye was progressed with hip strengthening and core stability interventions. Introduced frontal plane lumbar stability and single leg RDLs. She felt very challenged by RDLs. She demonstrated good tolerance to exercises with fatigue but no increase in symptoms. At end of session, patient notes no back pain at all.    Shaye Is progressing well towards her goals.   Patient prognosis is Good.     Patient will continue to benefit from skilled outpatient physical therapy to address the deficits listed in the problem list box on initial evaluation, provide pt/family education and to maximize pt's level of independence in the home and community environment.     Patient's spiritual, cultural and educational needs considered and pt agreeable to plan of care and goals.     Anticipated barriers to physical therapy: chronicity    Goals:   Short Term Goals (6 Weeks):  1. Patient will report decreased low back pain at worst to </= 5/10 to increase tolerance for activities of daily living. Progressing, not met  2. Patient will increase lumbar extension range of motion to 90% norms in order to perform activities of daily living with less difficulty. Progressing, not met  3. Patient will improve impaired lower extremity manual muscle tests by 1/3 grade to improve strength for functional tasks. Progressing, not met  4. Patient will be  compliant with home exercise program to supplement therapy in restoring functional mobility. Progressing, not met     Long Term Goals (12 Weeks):   1. Patient will report decreased low back pain at worst to </= 2/10 to increase tolerance for recreational exercise. Progressing, not met  2. Patient will improve impaired lower extremity manual muscle tests to >/=4+/5 to improve strength for functional tasks. Progressing, not met  3. Patient will improve FOTO score to >/= 56% to decrease perceived limitation with functional mobility. Progressing, not met  4. Patient goal: strengthening her low back so she can return to walking and recreational exercise. Progressing, not met  5. Patient will perform 20# deadlift with good control to demonstrate improved core strength. Progressing, not met    Plan     Plan of care Certification: 12/4/2023 to 2/26/2024.    Improve hip strength and core stability    Luis Tran, PT, DPT

## 2023-12-28 ENCOUNTER — CLINICAL SUPPORT (OUTPATIENT)
Dept: REHABILITATION | Facility: HOSPITAL | Age: 33
End: 2023-12-28
Payer: MEDICAID

## 2023-12-28 DIAGNOSIS — M54.50 CHRONIC RIGHT-SIDED LOW BACK PAIN WITHOUT SCIATICA: Primary | ICD-10-CM

## 2023-12-28 DIAGNOSIS — G89.29 CHRONIC RIGHT-SIDED LOW BACK PAIN WITHOUT SCIATICA: Primary | ICD-10-CM

## 2023-12-28 PROCEDURE — 97110 THERAPEUTIC EXERCISES: CPT

## 2023-12-28 NOTE — PROGRESS NOTES
"[facility]   Physical Therapy Treatment Note      Name: Shaye Bermudez  Clinic Number: [id number]    Therapy Diagnosis:   Encounter Diagnosis   Name Primary?    Chronic right-sided low back pain without sciatica Yes       Physician: Order, Paper    Visit Date: 12/28/2023    Physician Orders: PT Eval and Treat  Medical Diagnosis from Referral: M54.16 (ICD-10-CM) - Lumbar back pain with radiculopathy affecting lower extremity M47.817 (ICD-10-CM) - Facet hypertrophy of lumbosacral region  Evaluation Date: 12/4/2023  Authorization Period Expiration: 12/31/2023  Plan of Care Expiration: 2/26/2024  Progress Note Due: 1/2/2024  Visit # / Visits authorized: 4/20 + EVAL  FOTO: 1/3     Precautions: Standard      Time In: 904  Time Out: 1000  Total Billable Time: 56 minutes    Subjective     Patient reports: continued no low back pain at arrival. Felt good after last session.    She was compliant with home exercise program.  Response to previous treatment: no exacerbation of symptoms  Functional change: decreased low back pain    Pain: 0/10  Location: right low back     Objective      Objective Measures updated at progress report unless specified.     Treatment     All units billed as therex per Medicaid guideline     Shaye received the treatments listed below:      therapeutic exercises to develop strength, endurance, posture, and core stabilization for 56 minutes including:  Bird dogs: 3x10 with dowel for tactile cueing  Bridges: BTB, 3x10  Modified side plank clam: green theraband, 3x10 ea  Leg press machine: DL, 160#, 3x12  SL RDL: 15# KB, 3x8 ea  Suitcase carry: 26#, 2 laps length of turf each hand  Lunge chops: 8# MB, 3x10    NP:  Posterior pelvic tilt: 10x10"  Dead bugs: 3x10  Standing pallof press with lift: double GTB, 3x8 ea  Kneeling med ball press: 10#, 3x10  Deadlift: 5" step, 15# KB, 3x8    manual therapy techniques were applied for 0 minutes, including:  Gr III long axis hip " distraction right   Gr III L5 gapping  Supine long axis hip HVLAT right  Supine thoracic HVLAT    Patient Education and Home Exercises       Education provided:   - Continue prior home exercise program    Written Home Exercises Provided: Patient instructed to cont prior HEP. Exercises were reviewed and Shaye was able to demonstrate them prior to the end of the session.  Shaye demonstrated good  understanding of the education provided. See Electronic Medical Record under Patient Instructions for exercises provided during therapy sessions    Assessment     Shaye presents to session with continued report of no lumbar pain. She did have bout of right sided low back pain with modified side plank clams again today. Bridges were slightly painful but addition of theraband to incorporate more glute activation resolved symptoms. Continues to be challenged by single leg RDLs with fatigue but no increase in pain. Able to progress antirotational core stability to chops. Patient is making good progress with physical therapy and left with no low back pain. Will continue to progress as tolerated.    Shaye Is progressing well towards her goals.   Patient prognosis is Good.     Patient will continue to benefit from skilled outpatient physical therapy to address the deficits listed in the problem list box on initial evaluation, provide pt/family education and to maximize pt's level of independence in the home and community environment.     Patient's spiritual, cultural and educational needs considered and pt agreeable to plan of care and goals.     Anticipated barriers to physical therapy: chronicity    Goals:   Short Term Goals (6 Weeks):  1. Patient will report decreased low back pain at worst to </= 5/10 to increase tolerance for activities of daily living. Progressing, not met  2. Patient will increase lumbar extension range of motion to 90% norms in order to perform activities of daily living with less difficulty. Progressing,  not met  3. Patient will improve impaired lower extremity manual muscle tests by 1/3 grade to improve strength for functional tasks. Progressing, not met  4. Patient will be compliant with home exercise program to supplement therapy in restoring functional mobility. Progressing, not met     Long Term Goals (12 Weeks):   1. Patient will report decreased low back pain at worst to </= 2/10 to increase tolerance for recreational exercise. Progressing, not met  2. Patient will improve impaired lower extremity manual muscle tests to >/=4+/5 to improve strength for functional tasks. Progressing, not met  3. Patient will improve FOTO score to >/= 56% to decrease perceived limitation with functional mobility. Progressing, not met  4. Patient goal: strengthening her low back so she can return to walking and recreational exercise. Progressing, not met  5. Patient will perform 20# deadlift with good control to demonstrate improved core strength. Progressing, not met    Plan     Plan of care Certification: 12/4/2023 to 2/26/2024.    Improve hip strength and core stability    Luis Tran, PT, DPT

## 2024-01-04 ENCOUNTER — CLINICAL SUPPORT (OUTPATIENT)
Dept: REHABILITATION | Facility: HOSPITAL | Age: 34
End: 2024-01-04
Payer: MEDICAID

## 2024-01-04 DIAGNOSIS — M54.50 CHRONIC RIGHT-SIDED LOW BACK PAIN WITHOUT SCIATICA: Primary | ICD-10-CM

## 2024-01-04 DIAGNOSIS — G89.29 CHRONIC RIGHT-SIDED LOW BACK PAIN WITHOUT SCIATICA: Primary | ICD-10-CM

## 2024-01-04 PROCEDURE — 97110 THERAPEUTIC EXERCISES: CPT

## 2024-01-08 NOTE — PROGRESS NOTES
"[facility]   Physical Therapy Treatment Note      Name: Shaye Bermudez  Clinic Number: [id number]    Therapy Diagnosis:   Encounter Diagnosis   Name Primary?    Chronic right-sided low back pain without sciatica Yes     Physician: Order, Paper    Visit Date: 1/4/2024    Physician Orders: PT Eval and Treat  Medical Diagnosis from Referral: M54.16 (ICD-10-CM) - Lumbar back pain with radiculopathy affecting lower extremity M47.817 (ICD-10-CM) - Facet hypertrophy of lumbosacral region  Evaluation Date: 12/4/2023  Authorization Period Expiration: 12/31/2024  Plan of Care Expiration: 2/26/2024  Progress Note Due: 1/2/2024  Visit # / Visits authorized: 1/20 + EVAL  Total visits: 5  FOTO: 1/3     Precautions: Standard      Time In: 1005  Time Out: 1100  Total Billable Time: 55 minutes    Subjective     Patient reports: she cancelled last visit due to increased low back pain. However, she is not having any pain today.    She was compliant with home exercise program.  Response to previous treatment: no exacerbation of symptoms  Functional change: decreased low back pain    Pain: 0/10  Location: right low back     Objective      Objective Measures updated at progress report unless specified.     Treatment     All units billed as therex per Medicaid guideline     Shaye received the treatments listed below:      therapeutic exercises to develop strength, endurance, posture, and core stabilization for 55 minutes including:  Bridges: BTB, 3x10  Modified side plank clam: green theraband, 3x10 ea  Dead bugs: 3x10  Leg press machine: DL, 170#, 3x12  SL RDL: 15# KB, 3x8 ea  Half kneeling lunge chops: 8# MB, 3x10  DL squats: GTB above knees, 3x10    NP:  Suitcase carry: 26#, 2 laps length of turf each hand  Bird dogs: 3x10 with dowel for tactile cueing  Standing pallof press with lift: double GTB, 3x8 ea  Kneeling med ball press: 10#, 3x10  Deadlift: 5" step, 15# KB, 3x8    manual therapy techniques " were applied for 0 minutes, including:  Gr III long axis hip distraction right   Gr III L5 gapping  Supine long axis hip HVLAT right  Supine thoracic HVLAT    Patient Education and Home Exercises       Education provided:   - Continue prior home exercise program    Written Home Exercises Provided: Patient instructed to cont prior HEP. Exercises were reviewed and Shaye was able to demonstrate them prior to the end of the session.  Shaye demonstrated good  understanding of the education provided. See Electronic Medical Record under Patient Instructions for exercises provided during therapy sessions    Assessment     Shaye noted increased low back pain 3 days ago, however, no symptoms at arrival. Continued with lower extremity strengthening, emphasizing glutes, and core stability. She demonstrated improved performance with core interventions. She tolerated therapeutic interventions well with no increase in pain. Patient is still quick to fatigue, indicating need for improved cardiovascular and muscular endurance. Will continue to progress interventions as tolerated.    Shaye Is progressing well towards her goals.   Patient prognosis is Good.     Patient will continue to benefit from skilled outpatient physical therapy to address the deficits listed in the problem list box on initial evaluation, provide pt/family education and to maximize pt's level of independence in the home and community environment.     Patient's spiritual, cultural and educational needs considered and pt agreeable to plan of care and goals.     Anticipated barriers to physical therapy: chronicity    Goals:   Short Term Goals (6 Weeks):  1. Patient will report decreased low back pain at worst to </= 5/10 to increase tolerance for activities of daily living. Progressing, not met  2. Patient will increase lumbar extension range of motion to 90% norms in order to perform activities of daily living with less difficulty. Progressing, not met  3. Patient  will improve impaired lower extremity manual muscle tests by 1/3 grade to improve strength for functional tasks. Progressing, not met  4. Patient will be compliant with home exercise program to supplement therapy in restoring functional mobility. Progressing, not met     Long Term Goals (12 Weeks):   1. Patient will report decreased low back pain at worst to </= 2/10 to increase tolerance for recreational exercise. Progressing, not met  2. Patient will improve impaired lower extremity manual muscle tests to >/=4+/5 to improve strength for functional tasks. Progressing, not met  3. Patient will improve FOTO score to >/= 56% to decrease perceived limitation with functional mobility. Progressing, not met  4. Patient goal: strengthening her low back so she can return to walking and recreational exercise. Progressing, not met  5. Patient will perform 20# deadlift with good control to demonstrate improved core strength. Progressing, not met    Plan     Plan of care Certification: 12/4/2023 to 2/26/2024.    Improve hip strength and core stability    Luis Tran, PT, DPT

## 2024-01-29 ENCOUNTER — PATIENT MESSAGE (OUTPATIENT)
Dept: REHABILITATION | Facility: HOSPITAL | Age: 34
End: 2024-01-29
Payer: MEDICAID

## 2024-02-29 ENCOUNTER — CLINICAL SUPPORT (OUTPATIENT)
Dept: OBSTETRICS AND GYNECOLOGY | Facility: CLINIC | Age: 34
End: 2024-02-29
Payer: MEDICAID

## 2024-02-29 DIAGNOSIS — Z23 NEED FOR HPV VACCINE: Primary | ICD-10-CM

## 2024-02-29 PROCEDURE — 99999 PR PBB SHADOW E&M-EST. PATIENT-LVL I: CPT | Mod: PBBFAC,,,

## 2024-02-29 PROCEDURE — 90471 IMMUNIZATION ADMIN: CPT | Mod: PBBFAC

## 2024-02-29 PROCEDURE — 99999PBSHW HPV VACCINE 9-VALENT 3 DOSE IM: Mod: PBBFAC,,,
